# Patient Record
Sex: MALE | Race: WHITE | Employment: UNEMPLOYED | URBAN - METROPOLITAN AREA
[De-identification: names, ages, dates, MRNs, and addresses within clinical notes are randomized per-mention and may not be internally consistent; named-entity substitution may affect disease eponyms.]

---

## 2017-01-16 ENCOUNTER — OFFICE VISIT (OUTPATIENT)
Dept: INTERNAL MEDICINE CLINIC | Facility: CLINIC | Age: 47
End: 2017-01-16

## 2017-01-16 VITALS
HEIGHT: 73 IN | HEART RATE: 80 BPM | BODY MASS INDEX: 31.41 KG/M2 | TEMPERATURE: 98 F | WEIGHT: 237 LBS | SYSTOLIC BLOOD PRESSURE: 126 MMHG | DIASTOLIC BLOOD PRESSURE: 81 MMHG

## 2017-01-16 DIAGNOSIS — L21.9 SEBORRHEIC DERMATITIS: ICD-10-CM

## 2017-01-16 DIAGNOSIS — R22.1 NECK MASS: ICD-10-CM

## 2017-01-16 DIAGNOSIS — R22.1 MASS OF RIGHT SIDE OF NECK: Primary | ICD-10-CM

## 2017-01-16 DIAGNOSIS — Z72.0 TOBACCO ABUSE: ICD-10-CM

## 2017-01-16 PROCEDURE — 99214 OFFICE O/P EST MOD 30 MIN: CPT | Performed by: INTERNAL MEDICINE

## 2017-01-16 PROCEDURE — 99212 OFFICE O/P EST SF 10 MIN: CPT | Performed by: INTERNAL MEDICINE

## 2017-01-16 RX ORDER — HYDROCODONE BITARTRATE AND ACETAMINOPHEN 10; 325 MG/1; MG/1
1 TABLET ORAL EVERY 8 HOURS PRN
Qty: 30 TABLET | Refills: 0 | Status: SHIPPED | OUTPATIENT
Start: 2017-01-16 | End: 2017-03-27

## 2017-01-16 RX ORDER — MOMETASONE FUROATE 1 MG/G
CREAM TOPICAL
Qty: 50 G | Refills: 0 | Status: SHIPPED | OUTPATIENT
Start: 2017-01-16 | End: 2017-02-13 | Stop reason: ALTCHOICE

## 2017-01-17 NOTE — PROGRESS NOTES
HPI:    Patient ID: Nando Lock is a 55year old male.     HPI    Right anterior neck lipom large  Would like referal to follow up with  Surgery    New to me   Complaint of low back pain take hydrocodone rarely would like a refill for 30 pills      Pt  His swelling and gait problem. Skin: Negative for rash. Neurological: Negative for syncope, weakness, light-headedness and headaches. Hematological: Negative for adenopathy. Psychiatric/Behavioral: Negative for behavioral problems and agitation. distension. There is no tenderness. Musculoskeletal: He exhibits no edema. Lymphadenopathy:     He has no cervical adenopathy. Neurological: He is alert. Skin: He is not diaphoretic. Psychiatric: He has a normal mood and affect.  His behavior is n

## 2017-02-13 ENCOUNTER — OFFICE VISIT (OUTPATIENT)
Dept: SURGERY | Facility: CLINIC | Age: 47
End: 2017-02-13

## 2017-02-13 DIAGNOSIS — R22.1 MASS OF RIGHT SIDE OF NECK: Primary | ICD-10-CM

## 2017-02-13 PROCEDURE — 99214 OFFICE O/P EST MOD 30 MIN: CPT | Performed by: SURGERY

## 2017-02-13 PROCEDURE — 99212 OFFICE O/P EST SF 10 MIN: CPT | Performed by: SURGERY

## 2017-02-13 NOTE — H&P
History and Physical      Pipo Elizondo is a 55year old male. HPI   Patient presents with:  Mass: Pt states here to discuss removal of mass on right neck. Pt states mass present for 20 yrs - has increased in size initially.   Pt denies drainage or emeterio ROS   A comprehensive 10 point review of systems was completed. Pertinent positives and negatives noted in the the HPI. PHYSICAL EXAM   There is no weight on file to calculate BMI. No LMP for male patient.   Constitutional: appears well hyd

## 2017-02-16 ENCOUNTER — TELEPHONE (OUTPATIENT)
Dept: SURGERY | Facility: CLINIC | Age: 47
End: 2017-02-16

## 2017-02-16 NOTE — TELEPHONE ENCOUNTER
Request for prior authorization for CT soft tissue neck scheduled 02/20/2017 started via [employer] Request pending for further evaluation. Tracking #[id number]. Awaiting further instruction/notification from [employer].

## 2017-02-16 NOTE — TELEPHONE ENCOUNTER
[place]eville[employer] insurance verification xt: [id number] requesting prior auth for CT/neck. Pt is scheduled for CT on Mon 02/20.  Please call, thank you

## 2017-02-20 ENCOUNTER — HOSPITAL ENCOUNTER (OUTPATIENT)
Dept: CT IMAGING | Facility: HOSPITAL | Age: 47
Discharge: HOME | End: 2017-02-20
Attending: SURGERY
Payer: MEDICAID

## 2017-02-20 DIAGNOSIS — R22.1 MASS OF RIGHT SIDE OF NECK: ICD-10-CM

## 2017-02-20 LAB — CREAT BLD-MCNC: 0.9 MG/DL (ref 0.5–1.5)

## 2017-02-20 PROCEDURE — 82565 ASSAY OF CREATININE: CPT

## 2017-02-20 PROCEDURE — 70491 CT SOFT TISSUE NECK W/DYE: CPT

## 2017-02-20 NOTE — TELEPHONE ENCOUNTER
Per [facility], exam has been approved [id number]. LM on VM of Neville from insurance verification notifying him of this. CB number given if questions.

## 2017-02-23 ENCOUNTER — TELEPHONE (OUTPATIENT)
Dept: SURGERY | Facility: CLINIC | Age: 47
End: 2017-02-23

## 2017-02-23 NOTE — TELEPHONE ENCOUNTER
Patient notified of results and recommendations, referred to Dr. Teena Schultz or Dr. Fernanda Reynolds. Patient verbalized understanding, phone numbers provided.

## 2017-03-27 ENCOUNTER — OFFICE VISIT (OUTPATIENT)
Dept: INTERNAL MEDICINE CLINIC | Facility: CLINIC | Age: 47
End: 2017-03-27

## 2017-03-27 VITALS
HEIGHT: 73 IN | DIASTOLIC BLOOD PRESSURE: 74 MMHG | BODY MASS INDEX: 31.14 KG/M2 | SYSTOLIC BLOOD PRESSURE: 120 MMHG | HEART RATE: 64 BPM | WEIGHT: 235 LBS | RESPIRATION RATE: 18 BRPM | TEMPERATURE: 98 F

## 2017-03-27 DIAGNOSIS — Z72.0 TOBACCO ABUSE: ICD-10-CM

## 2017-03-27 DIAGNOSIS — R22.1 MASS IN NECK: ICD-10-CM

## 2017-03-27 DIAGNOSIS — E66.9 OBESITY (BMI 30.0-34.9): ICD-10-CM

## 2017-03-27 DIAGNOSIS — J44.9 CHRONIC OBSTRUCTIVE PULMONARY DISEASE, UNSPECIFIED COPD TYPE (HCC): ICD-10-CM

## 2017-03-27 DIAGNOSIS — Z00.00 ROUTINE GENERAL MEDICAL EXAMINATION AT A HEALTH CARE FACILITY: Primary | ICD-10-CM

## 2017-03-27 PROCEDURE — 99396 PREV VISIT EST AGE 40-64: CPT | Performed by: INTERNAL MEDICINE

## 2017-03-27 RX ORDER — HYDROCODONE BITARTRATE AND ACETAMINOPHEN 10; 325 MG/1; MG/1
1 TABLET ORAL EVERY 8 HOURS PRN
Qty: 30 TABLET | Refills: 0 | Status: SHIPPED | OUTPATIENT
Start: 2017-03-27 | End: 2017-05-22

## 2017-03-28 ENCOUNTER — APPOINTMENT (OUTPATIENT)
Dept: LAB | Age: 47
End: 2017-03-28
Attending: INTERNAL MEDICINE
Payer: MEDICAID

## 2017-03-28 ENCOUNTER — HOSPITAL ENCOUNTER (OUTPATIENT)
Dept: GENERAL RADIOLOGY | Age: 47
Discharge: HOME | End: 2017-03-28
Attending: INTERNAL MEDICINE
Payer: MEDICAID

## 2017-03-28 DIAGNOSIS — Z00.00 ROUTINE GENERAL MEDICAL EXAMINATION AT A HEALTH CARE FACILITY: ICD-10-CM

## 2017-03-28 DIAGNOSIS — R22.1 MASS IN NECK: ICD-10-CM

## 2017-03-28 PROCEDURE — 84439 ASSAY OF FREE THYROXINE: CPT

## 2017-03-28 PROCEDURE — 93010 ELECTROCARDIOGRAM REPORT: CPT

## 2017-03-28 PROCEDURE — 84443 ASSAY THYROID STIM HORMONE: CPT

## 2017-03-28 PROCEDURE — 85027 COMPLETE CBC AUTOMATED: CPT

## 2017-03-28 PROCEDURE — 81003 URINALYSIS AUTO W/O SCOPE: CPT

## 2017-03-28 PROCEDURE — 80061 LIPID PANEL: CPT

## 2017-03-28 PROCEDURE — 71020 XR CHEST PA + LAT CHEST (CPT=71020): CPT

## 2017-03-28 PROCEDURE — 93005 ELECTROCARDIOGRAM TRACING: CPT

## 2017-03-28 PROCEDURE — 36415 COLL VENOUS BLD VENIPUNCTURE: CPT

## 2017-03-28 PROCEDURE — 80053 COMPREHEN METABOLIC PANEL: CPT

## 2017-03-28 NOTE — PROGRESS NOTES
HPI:    Patient ID: Xander Stern is a 55year old male.     HPI    Physical/ Pre Op  Excision or neck mass      Right sided neck mass   Was seen by Dr Melita Powell    Referred to ENT for management  CT findings   Pt is here to discuss  Complain of neck pain would Hematological: Negative for adenopathy. Does not bruise/bleed easily. Psychiatric/Behavioral: Negative for behavioral problems and agitation.            Current Outpatient Prescriptions:  HYDROcodone-acetaminophen  MG Oral Tab Take 1 tablet by vance tenderness. Lymphadenopathy:     He has no cervical adenopathy. Neurological: He is alert. Skin: No rash noted. He is not diaphoretic. No erythema. Psychiatric: He has a normal mood and affect.  His behavior is normal.            ASSESSMENT/PLAN:

## 2017-04-11 ENCOUNTER — OFFICE VISIT (OUTPATIENT)
Dept: OTOLARYNGOLOGY | Facility: CLINIC | Age: 47
End: 2017-04-11

## 2017-04-11 VITALS
TEMPERATURE: 97 F | HEIGHT: 73 IN | DIASTOLIC BLOOD PRESSURE: 60 MMHG | BODY MASS INDEX: 30.48 KG/M2 | SYSTOLIC BLOOD PRESSURE: 98 MMHG | WEIGHT: 230 LBS

## 2017-04-11 DIAGNOSIS — R22.1 NECK MASS: Primary | ICD-10-CM

## 2017-04-11 PROCEDURE — 99212 OFFICE O/P EST SF 10 MIN: CPT | Performed by: OTOLARYNGOLOGY

## 2017-04-11 PROCEDURE — 99244 OFF/OP CNSLTJ NEW/EST MOD 40: CPT | Performed by: OTOLARYNGOLOGY

## 2017-04-11 NOTE — PROGRESS NOTES
Ivan Tellez is a 55year old male. Patient presents with:  Consult: mass in right neck x several years      HISTORY OF PRESENT ILLNESS  He presents with a several year history of enlarging right neck mass by his angle of the mandible.  He states that this Negative Abdominal pain and diarrhea. Endocrine Negative Cold intolerance and heat intolerance. Neuro Negative Tremors. Psych Negative Anxiety and depression. Integumentary Negative Frequent skin infections, pigment change and rash.    Hema/Lymph Ne 0  ASSESSMENT AND PLAN    1. Neck mass  I personally reviewed his CT scan this demonstrates this mass to be anterior to the platysma.  I did recommend excision of this mass and there should be minimal risk to any nervous structures due to the fact that it i

## 2017-04-13 ENCOUNTER — TELEPHONE (OUTPATIENT)
Dept: OTOLARYNGOLOGY | Facility: CLINIC | Age: 47
End: 2017-04-13

## 2017-04-13 DIAGNOSIS — Z01.818 PREOP TESTING: Primary | ICD-10-CM

## 2017-04-19 ENCOUNTER — LAB ENCOUNTER (OUTPATIENT)
Dept: LAB | Facility: HOSPITAL | Age: 47
End: 2017-04-19
Attending: OTOLARYNGOLOGY
Payer: MEDICAID

## 2017-04-19 PROCEDURE — 93005 ELECTROCARDIOGRAM TRACING: CPT

## 2017-04-19 PROCEDURE — 93010 ELECTROCARDIOGRAM REPORT: CPT | Performed by: OTOLARYNGOLOGY

## 2017-05-01 ENCOUNTER — SURGERY (OUTPATIENT)
Age: 47
End: 2017-05-01

## 2017-05-01 ENCOUNTER — HOSPITAL ENCOUNTER (OUTPATIENT)
Facility: HOSPITAL | Age: 47
Setting detail: HOSPITAL OUTPATIENT SURGERY
Discharge: HOME | End: 2017-05-01
Attending: OTOLARYNGOLOGY | Admitting: OTOLARYNGOLOGY
Payer: MEDICAID

## 2017-05-01 ENCOUNTER — ANESTHESIA EVENT (OUTPATIENT)
Dept: SURGERY | Facility: HOSPITAL | Age: 47
End: 2017-05-01
Payer: MEDICAID

## 2017-05-01 ENCOUNTER — ANESTHESIA (OUTPATIENT)
Dept: SURGERY | Facility: HOSPITAL | Age: 47
End: 2017-05-01
Payer: MEDICAID

## 2017-05-01 VITALS
DIASTOLIC BLOOD PRESSURE: 70 MMHG | HEART RATE: 51 BPM | SYSTOLIC BLOOD PRESSURE: 127 MMHG | TEMPERATURE: 97 F | HEIGHT: 73 IN | OXYGEN SATURATION: 97 % | WEIGHT: 233.69 LBS | RESPIRATION RATE: 16 BRPM | BODY MASS INDEX: 30.97 KG/M2

## 2017-05-01 DIAGNOSIS — R22.1 NECK MASS: Primary | ICD-10-CM

## 2017-05-01 PROCEDURE — 13132 CMPLX RPR F/C/C/M/N/AX/G/H/F: CPT | Performed by: OTOLARYNGOLOGY

## 2017-05-01 PROCEDURE — 11426 EXC H-F-NK-SP B9+MARG >4 CM: CPT | Performed by: OTOLARYNGOLOGY

## 2017-05-01 PROCEDURE — 0HB4XZZ EXCISION OF NECK SKIN, EXTERNAL APPROACH: ICD-10-PCS | Performed by: OTOLARYNGOLOGY

## 2017-05-01 RX ORDER — MORPHINE SULFATE 4 MG/ML
8 INJECTION, SOLUTION INTRAMUSCULAR; INTRAVENOUS EVERY 2 HOUR PRN
Status: DISCONTINUED | OUTPATIENT
Start: 2017-05-01 | End: 2017-05-01

## 2017-05-01 RX ORDER — CEPHALEXIN 500 MG/1
500 CAPSULE ORAL EVERY 8 HOURS
Qty: 21 CAPSULE | Refills: 0 | Status: SHIPPED | OUTPATIENT
Start: 2017-05-01 | End: 2017-05-22 | Stop reason: ALTCHOICE

## 2017-05-01 RX ORDER — MORPHINE SULFATE 2 MG/ML
2 INJECTION, SOLUTION INTRAMUSCULAR; INTRAVENOUS EVERY 10 MIN PRN
Status: DISCONTINUED | OUTPATIENT
Start: 2017-05-01 | End: 2017-05-01

## 2017-05-01 RX ORDER — HYDROCODONE BITARTRATE AND ACETAMINOPHEN 5; 325 MG/1; MG/1
1 TABLET ORAL AS NEEDED
Status: DISCONTINUED | OUTPATIENT
Start: 2017-05-01 | End: 2017-05-01

## 2017-05-01 RX ORDER — ONDANSETRON 2 MG/ML
4 INJECTION INTRAMUSCULAR; INTRAVENOUS ONCE AS NEEDED
Status: DISCONTINUED | OUTPATIENT
Start: 2017-05-01 | End: 2017-05-01

## 2017-05-01 RX ORDER — HYDROMORPHONE HYDROCHLORIDE 1 MG/ML
0.4 INJECTION, SOLUTION INTRAMUSCULAR; INTRAVENOUS; SUBCUTANEOUS EVERY 5 MIN PRN
Status: DISCONTINUED | OUTPATIENT
Start: 2017-05-01 | End: 2017-05-01

## 2017-05-01 RX ORDER — DEXAMETHASONE SODIUM PHOSPHATE 4 MG/ML
VIAL (ML) INJECTION AS NEEDED
Status: DISCONTINUED | OUTPATIENT
Start: 2017-05-01 | End: 2017-05-01 | Stop reason: SURG

## 2017-05-01 RX ORDER — MIDAZOLAM HYDROCHLORIDE 1 MG/ML
INJECTION INTRAMUSCULAR; INTRAVENOUS AS NEEDED
Status: DISCONTINUED | OUTPATIENT
Start: 2017-05-01 | End: 2017-05-01 | Stop reason: SURG

## 2017-05-01 RX ORDER — LIDOCAINE HYDROCHLORIDE AND EPINEPHRINE 10; 10 MG/ML; UG/ML
INJECTION, SOLUTION INFILTRATION; PERINEURAL AS NEEDED
Status: DISCONTINUED | OUTPATIENT
Start: 2017-05-01 | End: 2017-05-01 | Stop reason: HOSPADM

## 2017-05-01 RX ORDER — HALOPERIDOL 5 MG/ML
0.25 INJECTION INTRAMUSCULAR ONCE AS NEEDED
Status: DISCONTINUED | OUTPATIENT
Start: 2017-05-01 | End: 2017-05-01

## 2017-05-01 RX ORDER — MORPHINE SULFATE 4 MG/ML
4 INJECTION, SOLUTION INTRAMUSCULAR; INTRAVENOUS EVERY 2 HOUR PRN
Status: DISCONTINUED | OUTPATIENT
Start: 2017-05-01 | End: 2017-05-01

## 2017-05-01 RX ORDER — SODIUM CHLORIDE, SODIUM LACTATE, POTASSIUM CHLORIDE, CALCIUM CHLORIDE 600; 310; 30; 20 MG/100ML; MG/100ML; MG/100ML; MG/100ML
INJECTION, SOLUTION INTRAVENOUS CONTINUOUS
Status: DISCONTINUED | OUTPATIENT
Start: 2017-05-01 | End: 2017-05-01

## 2017-05-01 RX ORDER — ACETAMINOPHEN 325 MG/1
650 TABLET ORAL ONCE
Status: COMPLETED | OUTPATIENT
Start: 2017-05-01 | End: 2017-05-01

## 2017-05-01 RX ORDER — HYDROCODONE BITARTRATE AND ACETAMINOPHEN 5; 325 MG/1; MG/1
2 TABLET ORAL AS NEEDED
Status: DISCONTINUED | OUTPATIENT
Start: 2017-05-01 | End: 2017-05-01

## 2017-05-01 RX ORDER — MORPHINE SULFATE 10 MG/ML
6 INJECTION, SOLUTION INTRAMUSCULAR; INTRAVENOUS EVERY 10 MIN PRN
Status: DISCONTINUED | OUTPATIENT
Start: 2017-05-01 | End: 2017-05-01

## 2017-05-01 RX ORDER — HYDROMORPHONE HYDROCHLORIDE 1 MG/ML
0.2 INJECTION, SOLUTION INTRAMUSCULAR; INTRAVENOUS; SUBCUTANEOUS EVERY 5 MIN PRN
Status: DISCONTINUED | OUTPATIENT
Start: 2017-05-01 | End: 2017-05-01

## 2017-05-01 RX ORDER — LIDOCAINE HYDROCHLORIDE 10 MG/ML
INJECTION, SOLUTION EPIDURAL; INFILTRATION; INTRACAUDAL; PERINEURAL AS NEEDED
Status: DISCONTINUED | OUTPATIENT
Start: 2017-05-01 | End: 2017-05-01 | Stop reason: SURG

## 2017-05-01 RX ORDER — LIDOCAINE HYDROCHLORIDE 40 MG/ML
SOLUTION TOPICAL AS NEEDED
Status: DISCONTINUED | OUTPATIENT
Start: 2017-05-01 | End: 2017-05-01 | Stop reason: SURG

## 2017-05-01 RX ORDER — SODIUM CHLORIDE, SODIUM LACTATE, POTASSIUM CHLORIDE, CALCIUM CHLORIDE 600; 310; 30; 20 MG/100ML; MG/100ML; MG/100ML; MG/100ML
INJECTION, SOLUTION INTRAVENOUS CONTINUOUS PRN
Status: DISCONTINUED | OUTPATIENT
Start: 2017-05-01 | End: 2017-05-01 | Stop reason: SURG

## 2017-05-01 RX ORDER — FAMOTIDINE 20 MG/1
20 TABLET ORAL ONCE
Status: COMPLETED | OUTPATIENT
Start: 2017-05-01 | End: 2017-05-01

## 2017-05-01 RX ORDER — MORPHINE SULFATE 2 MG/ML
2 INJECTION, SOLUTION INTRAMUSCULAR; INTRAVENOUS EVERY 2 HOUR PRN
Status: DISCONTINUED | OUTPATIENT
Start: 2017-05-01 | End: 2017-05-01

## 2017-05-01 RX ORDER — HYDROMORPHONE HYDROCHLORIDE 1 MG/ML
0.6 INJECTION, SOLUTION INTRAMUSCULAR; INTRAVENOUS; SUBCUTANEOUS EVERY 5 MIN PRN
Status: DISCONTINUED | OUTPATIENT
Start: 2017-05-01 | End: 2017-05-01

## 2017-05-01 RX ORDER — MORPHINE SULFATE 4 MG/ML
4 INJECTION, SOLUTION INTRAMUSCULAR; INTRAVENOUS EVERY 10 MIN PRN
Status: DISCONTINUED | OUTPATIENT
Start: 2017-05-01 | End: 2017-05-01

## 2017-05-01 RX ORDER — HYDROCODONE BITARTRATE AND ACETAMINOPHEN 5; 325 MG/1; MG/1
1 TABLET ORAL EVERY 4 HOURS PRN
Status: DISCONTINUED | OUTPATIENT
Start: 2017-05-01 | End: 2017-05-01

## 2017-05-01 RX ORDER — ONDANSETRON 2 MG/ML
INJECTION INTRAMUSCULAR; INTRAVENOUS AS NEEDED
Status: DISCONTINUED | OUTPATIENT
Start: 2017-05-01 | End: 2017-05-01 | Stop reason: SURG

## 2017-05-01 RX ORDER — ROCURONIUM BROMIDE 10 MG/ML
INJECTION, SOLUTION INTRAVENOUS AS NEEDED
Status: DISCONTINUED | OUTPATIENT
Start: 2017-05-01 | End: 2017-05-01 | Stop reason: SURG

## 2017-05-01 RX ORDER — SUCCINYLCHOLINE CHLORIDE 20 MG/ML
INJECTION INTRAMUSCULAR; INTRAVENOUS AS NEEDED
Status: DISCONTINUED | OUTPATIENT
Start: 2017-05-01 | End: 2017-05-01 | Stop reason: SURG

## 2017-05-01 RX ORDER — ACETAMINOPHEN 325 MG/1
650 TABLET ORAL EVERY 4 HOURS PRN
Status: DISCONTINUED | OUTPATIENT
Start: 2017-05-01 | End: 2017-05-01

## 2017-05-01 RX ORDER — SODIUM CHLORIDE 0.9 % (FLUSH) 0.9 %
10 SYRINGE (ML) INJECTION AS NEEDED
Status: DISCONTINUED | OUTPATIENT
Start: 2017-05-01 | End: 2017-05-01

## 2017-05-01 RX ORDER — METOCLOPRAMIDE 10 MG/1
10 TABLET ORAL ONCE
Status: COMPLETED | OUTPATIENT
Start: 2017-05-01 | End: 2017-05-01

## 2017-05-01 RX ORDER — ONDANSETRON 2 MG/ML
4 INJECTION INTRAMUSCULAR; INTRAVENOUS EVERY 6 HOURS PRN
Status: DISCONTINUED | OUTPATIENT
Start: 2017-05-01 | End: 2017-05-01

## 2017-05-01 RX ORDER — NALOXONE HYDROCHLORIDE 0.4 MG/ML
80 INJECTION, SOLUTION INTRAMUSCULAR; INTRAVENOUS; SUBCUTANEOUS AS NEEDED
Status: DISCONTINUED | OUTPATIENT
Start: 2017-05-01 | End: 2017-05-01

## 2017-05-01 RX ORDER — HYDROCODONE BITARTRATE AND ACETAMINOPHEN 5; 325 MG/1; MG/1
2 TABLET ORAL EVERY 4 HOURS PRN
Status: DISCONTINUED | OUTPATIENT
Start: 2017-05-01 | End: 2017-05-01

## 2017-05-01 RX ADMIN — MIDAZOLAM HYDROCHLORIDE 2 MG: 1 INJECTION INTRAMUSCULAR; INTRAVENOUS at 07:19:00

## 2017-05-01 RX ADMIN — LIDOCAINE HYDROCHLORIDE 4 ML: 40 SOLUTION TOPICAL at 07:22:00

## 2017-05-01 RX ADMIN — ONDANSETRON 4 MG: 2 INJECTION INTRAMUSCULAR; INTRAVENOUS at 07:31:00

## 2017-05-01 RX ADMIN — SUCCINYLCHOLINE CHLORIDE 140 MG: 20 INJECTION INTRAMUSCULAR; INTRAVENOUS at 07:22:00

## 2017-05-01 RX ADMIN — ROCURONIUM BROMIDE 10 MG: 10 INJECTION, SOLUTION INTRAVENOUS at 07:22:00

## 2017-05-01 RX ADMIN — SODIUM CHLORIDE, SODIUM LACTATE, POTASSIUM CHLORIDE, CALCIUM CHLORIDE: 600; 310; 30; 20 INJECTION, SOLUTION INTRAVENOUS at 08:00:00

## 2017-05-01 RX ADMIN — DEXAMETHASONE SODIUM PHOSPHATE 4 MG: 4 MG/ML VIAL (ML) INJECTION at 07:31:00

## 2017-05-01 RX ADMIN — SODIUM CHLORIDE, SODIUM LACTATE, POTASSIUM CHLORIDE, CALCIUM CHLORIDE: 600; 310; 30; 20 INJECTION, SOLUTION INTRAVENOUS at 07:19:00

## 2017-05-01 RX ADMIN — LIDOCAINE HYDROCHLORIDE 50 MG: 10 INJECTION, SOLUTION EPIDURAL; INFILTRATION; INTRACAUDAL; PERINEURAL at 07:22:00

## 2017-05-01 NOTE — INTERVAL H&P NOTE
Pre-op Diagnosis: Neck mass [R22.1]    The above referenced H&P was reviewed by Isma Dover. Teena Schultz MD on 5/1/2017, the patient was examined and no significant changes have occurred in the patient's condition since the H&P was performed.   I discussed with the

## 2017-05-01 NOTE — BRIEF OP NOTE
Pre-Operative Diagnosis: Neck mass [R22.1]     Post-Operative Diagnosis: Neck mass [R22.1]     Procedure Performed:   Procedure(s):  Excision of right neck mass       Surgeon(s) and Role:     * Jessica Perez MD - Primary     * Steve Lorenzo MD - Ass

## 2017-05-01 NOTE — ANESTHESIA PREPROCEDURE EVALUATION
Anesthesia PreOp Note    HPI:     Anthony Owusu is a 55year old male who presents for preoperative consultation requested by: Soumya Oleary MD    Date of Surgery: 5/1/2017    Procedure(s):  LESION WIDE EXCISION WITH SKIN GRAFT  Indication: Neck mass [R22 20. 00 Years     Smokeless tobacco: Not on file    Alcohol Use: Yes  0.0 oz/week    0 Standard drinks or equivalent per week         Comment: occasionally    Drug Use: Yes     Sexual Activity: Not on file   Not on file  Other Topics Concern   None on file desires the anesthetic management as planned.   David Milner  5/1/2017 7:00 AM

## 2017-05-01 NOTE — ANESTHESIA POSTPROCEDURE EVALUATION
Patient: Anthony Owusu    Procedure Summary     Date Anesthesia Start Anesthesia Stop Room / Location    05/01/17 0719 0805 300 [facility] MAIN OR 03 / 300 [facility] MAIN OR       Procedure Diagnosis Surgeon Responsible Provider    LESION WIDE EXCISION WITH SKIN GRAFT (Right )

## 2017-05-01 NOTE — H&P
HISTORY OF PRESENT ILLNESS  He presents with a several year history of enlarging right neck mass by his angle of the mandible. He states that this has been increasing in size dramatically over the past year.  He was seen by a general surgeon referred to us diarrhea.    Endocrine  Negative  Cold intolerance and heat intolerance.    Neuro  Negative  Tremors.    Psych  Negative  Anxiety and depression.    Integumentary  Negative  Frequent skin infections, pigment change and rash.    Hema/Lymph  Negative  Easy b needed for Pain., Disp: 30 tablet, Rfl: 0  ASSESSMENT AND PLAN    1. Neck mass  I personally reviewed his CT scan this demonstrates this mass to be anterior to the platysma.  I did recommend excision of this mass and there should be minimal risk to any nerv

## 2017-05-01 NOTE — OPERATIVE REPORT
[facility]    PATIENT'S NAME: Gerri Riley   ATTENDING PHYSICIAN: David Weiss MD   OPERATING PHYSICIAN: Olivia Ovalles.  Shira Weiss MD   PATIENT ACCOUNT#:   [id number]    LOCATION:  [facility]  MEDICAL RECORD #:   [id number]       DATE OF BIR closed using subcuticular 3-0 Vicryl suture. Skin edges were approximated, everted, and closed using a running subcuticular 4-0 Prolene suture.   The rent in the skin that was treated with removal of the cyst was closed using a vertical mattress stitch of

## 2017-05-01 NOTE — ADDENDUM NOTE
Addendum  created 05/01/17 4474 by Maricarmen Valderrama MD    Modules edited: Orders, PRL Based Order Sets

## 2017-05-02 ENCOUNTER — TELEPHONE (OUTPATIENT)
Dept: OTOLARYNGOLOGY | Facility: CLINIC | Age: 47
End: 2017-05-02

## 2017-05-02 NOTE — TELEPHONE ENCOUNTER
Pt is post op day 1, excision of R neck mass. Per pt he is doing well, pain is tolerable.   Advised pt to keep site clean and dry, keep site elevated to prevent swelling, continue abx as prescribed, monitor for temp > 102 deg F, drainage or bleeding from i

## 2017-05-04 ENCOUNTER — TELEPHONE (OUTPATIENT)
Dept: OTOLARYNGOLOGY | Facility: CLINIC | Age: 47
End: 2017-05-04

## 2017-05-09 ENCOUNTER — OFFICE VISIT (OUTPATIENT)
Dept: OTOLARYNGOLOGY | Facility: CLINIC | Age: 47
End: 2017-05-09

## 2017-05-09 VITALS
SYSTOLIC BLOOD PRESSURE: 120 MMHG | BODY MASS INDEX: 30.48 KG/M2 | DIASTOLIC BLOOD PRESSURE: 83 MMHG | TEMPERATURE: 98 F | HEIGHT: 73 IN | WEIGHT: 230 LBS

## 2017-05-09 DIAGNOSIS — R22.1 NECK MASS: Primary | ICD-10-CM

## 2017-05-09 PROCEDURE — 99212 OFFICE O/P EST SF 10 MIN: CPT | Performed by: OTOLARYNGOLOGY

## 2017-05-09 PROCEDURE — 99024 POSTOP FOLLOW-UP VISIT: CPT | Performed by: OTOLARYNGOLOGY

## 2017-05-09 NOTE — PROGRESS NOTES
Mary Serrano is a 55year old male. Patient presents with:  Post-Op: s/p excision of right neck mass done 5/1/17      HISTORY OF PRESENT ILLNESS  He presents with a several year history of enlarging right neck mass by his angle of the mandible.  He states Negative Drooling. Eyes Negative Blurred vision and vision changes. Respiratory Negative Dyspnea and wheezing. Cardio Negative Chest pain, irregular heartbeat/palpitations and syncope. GI Negative Abdominal pain and diarrhea.    Endocrine Negative C Oral Cap, Take 1 capsule (500 mg total) by mouth every 8 (eight) hours. , Disp: 21 capsule, Rfl: 0  •  HYDROcodone-acetaminophen  MG Oral Tab, Take 1 tablet by mouth every 8 (eight) hours as needed for Pain., Disp: 30 tablet, Rfl: 0  ASSESSMENT AND PL

## 2017-05-10 ENCOUNTER — TELEPHONE (OUTPATIENT)
Dept: INTERNAL MEDICINE CLINIC | Facility: CLINIC | Age: 47
End: 2017-05-10

## 2017-05-10 RX ORDER — HYDROCODONE BITARTRATE AND ACETAMINOPHEN 10; 325 MG/1; MG/1
1 TABLET ORAL EVERY 8 HOURS PRN
Qty: 30 TABLET | Refills: 0 | Status: CANCELLED | OUTPATIENT
Start: 2017-05-10

## 2017-05-10 NOTE — TELEPHONE ENCOUNTER
Dr Mayte Omer, patient will be out of Altagracia Villalba in 3 days, post op from 5/1/17, no appt available for f/u visit within the next 3 days, please advise on appt or refill, prefers  rx at [facility]    Pending Prescriptions Disp Refills    HYDROcodone-acetaminophen 1

## 2017-05-10 NOTE — TELEPHONE ENCOUNTER
Pt asking if [facility] would refill [place]?  Had neck surgery- will be out within 3 days    Requested appt with Dr Jesus Robledo only- no slots  Said [facility] wanted to see him after his surgery  Or if does not have to be seen if could do rx  Prefers p/u ADO

## 2017-05-11 NOTE — TELEPHONE ENCOUNTER
i am not in the office today  What kind of symptom is he having  Phone in tramadol 50 mg po bid prn # 40

## 2017-05-11 NOTE — TELEPHONE ENCOUNTER
Dr. Awilda Escobar: Per patient, he has moderate pain on neck from surgery 2 weeks ago. He has been using his Hydrocodone he had for pain relief. He doesn't take it continuously, only as needed. He has about 4 pills left. He usually cuts them in half.      He dec

## 2017-05-18 NOTE — TELEPHONE ENCOUNTER
Pt advised of appt as stated below. Pt states unable to go to [address] [facility],  [address] location. Needs appt at [facility] as stated below. Therefore appt made for Monday 4/22/17 at .   Future Appointments  Date Time Provider Winifred Valadez   5/22/2017 1:00 PM Romain Vazquez

## 2017-05-22 ENCOUNTER — OFFICE VISIT (OUTPATIENT)
Dept: INTERNAL MEDICINE CLINIC | Facility: CLINIC | Age: 47
End: 2017-05-22

## 2017-05-22 VITALS
SYSTOLIC BLOOD PRESSURE: 108 MMHG | BODY MASS INDEX: 31 KG/M2 | HEART RATE: 82 BPM | WEIGHT: 232 LBS | DIASTOLIC BLOOD PRESSURE: 69 MMHG

## 2017-05-22 DIAGNOSIS — Z72.0 TOBACCO ABUSE: ICD-10-CM

## 2017-05-22 DIAGNOSIS — Z87.2 HISTORY OF EXCISION OF EPIDERMAL INCLUSION CYST: ICD-10-CM

## 2017-05-22 DIAGNOSIS — Z98.890 HISTORY OF EXCISION OF EPIDERMAL INCLUSION CYST: ICD-10-CM

## 2017-05-22 DIAGNOSIS — M54.50 CHRONIC LOW BACK PAIN WITHOUT SCIATICA, UNSPECIFIED BACK PAIN LATERALITY: Primary | ICD-10-CM

## 2017-05-22 DIAGNOSIS — J44.9 CHRONIC OBSTRUCTIVE PULMONARY DISEASE, UNSPECIFIED COPD TYPE (HCC): ICD-10-CM

## 2017-05-22 DIAGNOSIS — G89.29 CHRONIC LOW BACK PAIN WITHOUT SCIATICA, UNSPECIFIED BACK PAIN LATERALITY: Primary | ICD-10-CM

## 2017-05-22 PROCEDURE — 99214 OFFICE O/P EST MOD 30 MIN: CPT | Performed by: INTERNAL MEDICINE

## 2017-05-22 PROCEDURE — 99212 OFFICE O/P EST SF 10 MIN: CPT | Performed by: INTERNAL MEDICINE

## 2017-05-22 RX ORDER — HYDROCODONE BITARTRATE AND ACETAMINOPHEN 10; 325 MG/1; MG/1
1 TABLET ORAL EVERY 8 HOURS PRN
Qty: 30 TABLET | Refills: 0 | Status: SHIPPED | OUTPATIENT
Start: 2017-05-22 | End: 2017-09-18

## 2017-05-23 ENCOUNTER — TELEPHONE (OUTPATIENT)
Dept: INTERNAL MEDICINE CLINIC | Facility: CLINIC | Age: 47
End: 2017-05-23

## 2017-05-23 NOTE — TELEPHONE ENCOUNTER
Pt calling states he discuss results with Dr in office, pt states he would like to speak to nurse to obtain the actual cholesterol #.

## 2017-05-24 NOTE — TELEPHONE ENCOUNTER
Verified name and . Pt requesting exact number results from Lipid panel. Results given and pt understanding. Enc low cholesterol diet/exercise per Dr Carmen Long recommendations and pt verb understanding.

## 2017-06-05 NOTE — PROGRESS NOTES
HPI:    Patient ID: Pipo Elizondo is a 52year old male.     HPI      Here for follow up    S/p op  Wound healing well no redness no fever       /01/17 0719 0805 [facility] MAIN OR 03 / [facility] MAIN OR           Procedure Diagnosis Surgeon Responsible Provider       LE Pain. Disp: 30 tablet Rfl: 0     Allergies:No Known Allergies    HISTORY:  Past Medical History   Diagnosis Date   • Bell's palsy    • COPD (chronic obstructive pulmonary disease) ([facility] [address]    • Hyperlipidemia    • Mass of right side of neck    • Vitamin D def (M54.5,  G89.29) Chronic low back pain without sciatica, unspecified back pain laterality  (primary encounter diagnosis)  Plan:  SIDE EFFECT DISCUSSED  PATIENT IS NOT AT ALL TO OPERATE A MAHCINERY OR DRIVE A VEHICLE WHEN TAKING THIS MEDICATION.   PATIENT

## 2017-09-18 ENCOUNTER — OFFICE VISIT (OUTPATIENT)
Dept: INTERNAL MEDICINE CLINIC | Facility: CLINIC | Age: 47
End: 2017-09-18

## 2017-09-18 VITALS
TEMPERATURE: 98 F | DIASTOLIC BLOOD PRESSURE: 71 MMHG | WEIGHT: 225 LBS | HEART RATE: 76 BPM | HEIGHT: 73 IN | SYSTOLIC BLOOD PRESSURE: 136 MMHG | BODY MASS INDEX: 29.82 KG/M2

## 2017-09-18 DIAGNOSIS — M77.8 RIGHT ELBOW TENDINITIS: ICD-10-CM

## 2017-09-18 DIAGNOSIS — G89.29 CHRONIC LOW BACK PAIN WITHOUT SCIATICA, UNSPECIFIED BACK PAIN LATERALITY: ICD-10-CM

## 2017-09-18 DIAGNOSIS — M54.50 CHRONIC LOW BACK PAIN WITHOUT SCIATICA, UNSPECIFIED BACK PAIN LATERALITY: ICD-10-CM

## 2017-09-18 DIAGNOSIS — E78.5 HYPERLIPIDEMIA, UNSPECIFIED HYPERLIPIDEMIA TYPE: Primary | ICD-10-CM

## 2017-09-18 DIAGNOSIS — L21.9 SEBORRHEIC DERMATITIS: ICD-10-CM

## 2017-09-18 DIAGNOSIS — Z72.0 TOBACCO ABUSE: ICD-10-CM

## 2017-09-18 DIAGNOSIS — J34.2 NASAL SEPTAL DEVIATION: ICD-10-CM

## 2017-09-18 DIAGNOSIS — J44.9 CHRONIC OBSTRUCTIVE PULMONARY DISEASE, UNSPECIFIED COPD TYPE (HCC): ICD-10-CM

## 2017-09-18 DIAGNOSIS — K08.109 EDENTULOUS: ICD-10-CM

## 2017-09-18 PROCEDURE — 99214 OFFICE O/P EST MOD 30 MIN: CPT | Performed by: INTERNAL MEDICINE

## 2017-09-18 PROCEDURE — 99212 OFFICE O/P EST SF 10 MIN: CPT | Performed by: INTERNAL MEDICINE

## 2017-09-18 RX ORDER — HYDROCODONE BITARTRATE AND ACETAMINOPHEN 10; 325 MG/1; MG/1
1 TABLET ORAL EVERY 8 HOURS PRN
Qty: 30 TABLET | Refills: 0 | Status: SHIPPED | OUTPATIENT
Start: 2017-09-18 | End: 2017-12-11

## 2017-09-18 NOTE — PROGRESS NOTES
HPI:    Patient ID: Geovany Del Rosario is a 52year old male.     HPI    Follow up  Changing insurance at the end of the monght  Facial rash forehaed and periorbiatl right  Left elbow pain  /71 (BP Location: Right arm, Patient Position: Sitting, Cuff Size: [facility])    • Hyperlipidemia    • Mass in neck    • Mass of right side of neck    • Vitamin D deficiency       Past Surgical History:  2005: ANKLE FRACTURE SURGERY Right  2016: DENTAL SURGERY PROCEDURE      Comment: total extraction  No date: EXCISE LESN NECK COPD type ([facility] [address]  Plan: mild per patient still smoking     (Z72.0) Tobacco abuse  Plan: cessation aidvised strongly    (M54.5,  G89.29) Chronic low back pain without sciatica, unspecified back pain laterality  Plan: mild       (J34.2) Nasal septal deviation

## 2017-09-25 ENCOUNTER — OFFICE VISIT (OUTPATIENT)
Dept: DERMATOLOGY CLINIC | Facility: CLINIC | Age: 47
End: 2017-09-25

## 2017-09-25 VITALS — WEIGHT: 225 LBS | HEIGHT: 73 IN | BODY MASS INDEX: 29.82 KG/M2

## 2017-09-25 DIAGNOSIS — L71.9 ROSACEA: ICD-10-CM

## 2017-09-25 DIAGNOSIS — L21.9 SEBORRHEIC DERMATITIS: ICD-10-CM

## 2017-09-25 DIAGNOSIS — L30.9 DERMATITIS: Primary | ICD-10-CM

## 2017-09-25 PROCEDURE — 99212 OFFICE O/P EST SF 10 MIN: CPT | Performed by: DERMATOLOGY

## 2017-09-25 PROCEDURE — 99202 OFFICE O/P NEW SF 15 MIN: CPT | Performed by: DERMATOLOGY

## 2017-09-25 RX ORDER — KETOCONAZOLE 20 MG/ML
SHAMPOO TOPICAL
Qty: 120 ML | Refills: 2 | Status: SHIPPED | OUTPATIENT
Start: 2017-09-25 | End: 2018-01-19

## 2017-09-25 RX ORDER — DOXYCYCLINE HYCLATE 100 MG/1
100 CAPSULE ORAL 2 TIMES DAILY
Qty: 60 CAPSULE | Refills: 2 | Status: SHIPPED | OUTPATIENT
Start: 2017-09-25 | End: 2017-10-25

## 2017-10-08 NOTE — PROGRESS NOTES
Jeanie Haddad is a 52year old male. Patient presents with:  Derm Problem: scaly patches on face ,denies pain itch or bleeding x 1 year . worse in heat, patient was using steroid cream which made it worse .             Review of patient's allergies indic Right  2016: DENTAL SURGERY PROCEDURE      Comment: total extraction  No date: EXCISE LESN NECK/CHEST,SUBCUTAN    Social History  Social History   Marital status: Single  Spouse name: N/A    Years of education: N/A  Number of children: 0     Occupational H conjunctival erythema, eyelid telangiectasias. No other areas of involvement. Exam otherwise significant for prominent erythema scaling flaking over the brows face nasolabial folds hairline.   Auricularly more follicular inflammatory papules over the sc follow-up as above.

## 2017-12-11 ENCOUNTER — OFFICE VISIT (OUTPATIENT)
Dept: INTERNAL MEDICINE CLINIC | Facility: CLINIC | Age: 47
End: 2017-12-11

## 2017-12-11 VITALS
WEIGHT: 225 LBS | HEIGHT: 73 IN | BODY MASS INDEX: 29.82 KG/M2 | DIASTOLIC BLOOD PRESSURE: 69 MMHG | SYSTOLIC BLOOD PRESSURE: 113 MMHG | HEART RATE: 60 BPM | TEMPERATURE: 98 F

## 2017-12-11 DIAGNOSIS — L21.9 SEBORRHEIC DERMATITIS: Primary | ICD-10-CM

## 2017-12-11 DIAGNOSIS — Z72.0 TOBACCO ABUSE: ICD-10-CM

## 2017-12-11 DIAGNOSIS — M54.50 CHRONIC LOW BACK PAIN WITHOUT SCIATICA, UNSPECIFIED BACK PAIN LATERALITY: ICD-10-CM

## 2017-12-11 DIAGNOSIS — E55.9 VITAMIN D DEFICIENCY: ICD-10-CM

## 2017-12-11 DIAGNOSIS — J44.9 CHRONIC OBSTRUCTIVE PULMONARY DISEASE, UNSPECIFIED COPD TYPE (HCC): ICD-10-CM

## 2017-12-11 DIAGNOSIS — G89.29 CHRONIC LOW BACK PAIN WITHOUT SCIATICA, UNSPECIFIED BACK PAIN LATERALITY: ICD-10-CM

## 2017-12-11 PROCEDURE — 99212 OFFICE O/P EST SF 10 MIN: CPT | Performed by: INTERNAL MEDICINE

## 2017-12-11 PROCEDURE — 99214 OFFICE O/P EST MOD 30 MIN: CPT | Performed by: INTERNAL MEDICINE

## 2017-12-11 RX ORDER — HYDROCODONE BITARTRATE AND ACETAMINOPHEN 10; 325 MG/1; MG/1
1 TABLET ORAL EVERY 8 HOURS PRN
Qty: 30 TABLET | Refills: 0 | Status: SHIPPED | OUTPATIENT
Start: 2017-12-11 | End: 2018-04-09

## 2017-12-11 NOTE — PROGRESS NOTES
HPI:    Patient ID: Tracy Vera is a 52year old male.     HPI    Follow up  Facial rash request derm  Refill norco  Chronic pain lower back  Only take norco occasionally  Down to 1/2/PPD cigarette    Feeling well   Working part time    /69 (BP Locat External Shampoo Apply to scalp 2 times per week.  Disp: 120 mL Rfl: 2     Allergies:No Known Allergies    HISTORY:  Past Medical History:   Diagnosis Date   • Bell's palsy    • [facility]    • Hyperlipidemia    • Mas mood and affect.  His behavior is normal.            ASSESSMENT/PLAN:   (L21.9) Seborrheic dermatitis  (primary encounter diagnosis)  Plan: DERM - INTERNAL        Mild fragrance free soap    (M54.5,  G89.29) Chronic low back pain without sciatica, unspecifi

## 2018-01-19 ENCOUNTER — OFFICE VISIT (OUTPATIENT)
Dept: DERMATOLOGY CLINIC | Facility: CLINIC | Age: 48
End: 2018-01-19

## 2018-01-19 DIAGNOSIS — L21.9 SEBORRHEIC DERMATITIS: ICD-10-CM

## 2018-01-19 DIAGNOSIS — L72.0 EPIDERMAL CYST: ICD-10-CM

## 2018-01-19 DIAGNOSIS — L71.9 ROSACEA: Primary | ICD-10-CM

## 2018-01-19 PROCEDURE — 99213 OFFICE O/P EST LOW 20 MIN: CPT | Performed by: DERMATOLOGY

## 2018-01-19 PROCEDURE — 99212 OFFICE O/P EST SF 10 MIN: CPT | Performed by: DERMATOLOGY

## 2018-01-19 RX ORDER — KETOCONAZOLE 20 MG/ML
SHAMPOO TOPICAL
Qty: 120 ML | Refills: 2 | Status: SHIPPED | OUTPATIENT
Start: 2018-01-19 | End: 2018-02-24

## 2018-01-19 RX ORDER — SULFAMETHOXAZOLE AND TRIMETHOPRIM 800; 160 MG/1; MG/1
TABLET ORAL
Refills: 0 | COMMUNITY
Start: 2018-01-08 | End: 2018-02-24

## 2018-01-29 NOTE — PROGRESS NOTES
Amparo Sanchez is a 52year old male.     Patient presents with:  Lesion: LOV 09/25/17 pt was seen for scaly patches on his face, pt here for follow up pt was given Metronidazole cream at last visit states that the cream is helpful but when he sweats it get Allergies:   No Known Allergies    Past Medical History:  No date: Bell's palsy  No date: COPD (chronic obstructive pulmonary disease) (*  No date: Hyperlipidemia  No date: Mass in neck  No date:  Mass of right side of neck  No date: Vitamin D deficienc or different no unusual exposures. No ocular symptoms itching, discharge irritation. Triggers:       Physical examination:  Well-developed well-nourished woman alert oriented in no acute distress.       Exam performed, including scalp, head, neck, face,na in this encounter. Meds & Refills for this Visit:   Signed Prescriptions Disp Refills    hydrocortisone 2.5 % External Cream 30 g 2      Sig: Apply every morning and evening.       Ketoconazole 2 % External Shampoo 120 mL 2      Sig: Apply to scalp 2 t

## 2018-01-30 ENCOUNTER — OFFICE VISIT (OUTPATIENT)
Dept: SURGERY | Facility: CLINIC | Age: 48
End: 2018-01-30

## 2018-01-30 VITALS — BODY MASS INDEX: 30 KG/M2 | WEIGHT: 225 LBS

## 2018-01-30 DIAGNOSIS — L72.3 SEBACEOUS CYST: Primary | ICD-10-CM

## 2018-01-30 PROCEDURE — 99212 OFFICE O/P EST SF 10 MIN: CPT | Performed by: SURGERY

## 2018-01-30 PROCEDURE — 99214 OFFICE O/P EST MOD 30 MIN: CPT | Performed by: SURGERY

## 2018-01-30 NOTE — H&P
History and Physical      Beryl Neville is a 52year old male. HPI   Patient presents with:  Cyst: 2 cysts on back.   Cyst on left upper back is painful to the left shoulder and radiating to the left chest.  Was seen in the ED, but was told it wasn't \" Years of education:                 Number of children: 0             Occupational History  Occupation          Employer            Comment                                                 unemployed    Social History Main Topics    Smoking status cysts on back and neck. DIAGNOSTICS      ASSESSMENT/PLAN   Assessment   Sebaceous cyst  (primary encounter diagnosis)    53 y/o male with recurrent abscess of a probable epidermal inclusion cyst of the L mid medial back.   He also has a similar low gr

## 2018-02-27 ENCOUNTER — TELEPHONE (OUTPATIENT)
Dept: SURGERY | Facility: CLINIC | Age: 48
End: 2018-02-27

## 2018-02-27 NOTE — TELEPHONE ENCOUNTER
Please provide CPT code for this procedure. Has anyone contacted the insurance company about prior [address]?

## 2018-02-28 ENCOUNTER — HOSPITAL ENCOUNTER (OUTPATIENT)
Facility: HOSPITAL | Age: 48
Setting detail: HOSPITAL OUTPATIENT SURGERY
Discharge: HOME | End: 2018-02-28
Attending: SURGERY | Admitting: SURGERY
Payer: MEDICAID

## 2018-02-28 ENCOUNTER — SURGERY (OUTPATIENT)
Age: 48
End: 2018-02-28

## 2018-02-28 VITALS
SYSTOLIC BLOOD PRESSURE: 127 MMHG | HEIGHT: 73 IN | DIASTOLIC BLOOD PRESSURE: 94 MMHG | WEIGHT: 225 LBS | OXYGEN SATURATION: 96 % | BODY MASS INDEX: 29.82 KG/M2 | HEART RATE: 56 BPM

## 2018-02-28 PROCEDURE — 0JQ70ZZ REPAIR BACK SUBCUTANEOUS TISSUE AND FASCIA, OPEN APPROACH: ICD-10-PCS | Performed by: SURGERY

## 2018-02-28 PROCEDURE — 11402 EXC TR-EXT B9+MARG 1.1-2 CM: CPT | Performed by: SURGERY

## 2018-02-28 PROCEDURE — 0JB73ZZ EXCISION OF BACK SUBCUTANEOUS TISSUE AND FASCIA, PERCUTANEOUS APPROACH: ICD-10-PCS | Performed by: SURGERY

## 2018-02-28 PROCEDURE — 12032 INTMD RPR S/A/T/EXT 2.6-7.5: CPT | Performed by: SURGERY

## 2018-02-28 RX ORDER — BUPIVACAINE HYDROCHLORIDE AND EPINEPHRINE 5; 5 MG/ML; UG/ML
INJECTION, SOLUTION PERINEURAL AS NEEDED
Status: DISCONTINUED | OUTPATIENT
Start: 2018-02-28 | End: 2018-02-28 | Stop reason: HOSPADM

## 2018-02-28 RX ORDER — HYDROCODONE BITARTRATE AND ACETAMINOPHEN 5; 325 MG/1; MG/1
2 TABLET ORAL EVERY 6 HOURS PRN
Status: DISCONTINUED | OUTPATIENT
Start: 2018-02-28 | End: 2018-02-28

## 2018-02-28 NOTE — H&P (VIEW-ONLY)
History and Physical      Tracy Vera is a 52year old male. HPI   Patient presents with:  Cyst: 2 cysts on back.   Cyst on left upper back is painful to the left shoulder and radiating to the left chest.  Was seen in the ED, but was told it wasn't \" Years of education:                 Number of children: 0             Occupational History  Occupation          Employer            Comment                                                 unemployed    Social History Main Topics    Smoking status cysts on back and neck. DIAGNOSTICS      ASSESSMENT/PLAN   Assessment   Sebaceous cyst  (primary encounter diagnosis)    53 y/o male with recurrent abscess of a probable epidermal inclusion cyst of the L mid medial back.   He also has a similar low gr

## 2018-02-28 NOTE — INTERVAL H&P NOTE
Pre-op Diagnosis: Skin cysts two on back     The above referenced H&P was reviewed by Joseph Yang MD on 2/28/2018, the patient was examined and no significant changes have occurred in the patient's condition since the H&P was performed.   I discussed with

## 2018-02-28 NOTE — BRIEF OP NOTE
Pre-Operative Diagnosis: Skin cysts two on back      Post-Operative Diagnosis: Skin cysts two on back      Procedure Performed:   Procedure(s):  Excisional biopsy of two skin cysts on back     Surgeon(s) and Role:     Manuel Austin MD - Primary    As

## 2018-02-28 NOTE — OPERATIVE REPORT
[facility]    PATIENT'S NAME: Ninoska Black   ATTENDING PHYSICIAN: Muna Salvador MD   OPERATING PHYSICIAN: Muna Salvador MD   PATIENT ACCOUNT#:   [id number]    LOCATION:  [address]  MEDICAL RECORD #:   [id number]       DATE OF BI It was closed in layers with 3-0 Vicryl suture and then a running 4-0 nylon suture. Elliptical skin incision was performed in the right lateral back over the cystic mass to include the pore.   Dissection continued sharply into the subcutaneous tissue usi

## 2018-03-12 ENCOUNTER — TELEPHONE (OUTPATIENT)
Dept: SURGERY | Facility: CLINIC | Age: 48
End: 2018-03-12

## 2018-03-12 ENCOUNTER — OFFICE VISIT (OUTPATIENT)
Dept: SURGERY | Facility: CLINIC | Age: 48
End: 2018-03-12

## 2018-03-12 DIAGNOSIS — L72.0 EPIDERMAL INCLUSION CYST: Primary | ICD-10-CM

## 2018-03-12 PROCEDURE — 99024 POSTOP FOLLOW-UP VISIT: CPT | Performed by: SURGERY

## 2018-03-12 PROCEDURE — 99212 OFFICE O/P EST SF 10 MIN: CPT | Performed by: SURGERY

## 2018-03-12 NOTE — TELEPHONE ENCOUNTER
\"No precert required\" per Merlene Mock at [facility] for procedure on 02/28/2018, reference # [id number].

## 2018-03-12 NOTE — PROGRESS NOTES
Postoperative Patient Follow-up      3/12/2018    Johana Mondragon 52year old      HPI  Patient presents with:  Post-Op: First post op.  S/P Excisional biopsy left mid back skin cyst (1.1 cm diameter), layered closure (2.1 cm length), and excisional biopsy,

## 2018-04-09 ENCOUNTER — OFFICE VISIT (OUTPATIENT)
Dept: INTERNAL MEDICINE CLINIC | Facility: CLINIC | Age: 48
End: 2018-04-09

## 2018-04-09 ENCOUNTER — HOSPITAL ENCOUNTER (OUTPATIENT)
Dept: GENERAL RADIOLOGY | Age: 48
Discharge: HOME | End: 2018-04-09
Attending: INTERNAL MEDICINE
Payer: MEDICAID

## 2018-04-09 VITALS
HEIGHT: 73 IN | WEIGHT: 232 LBS | HEART RATE: 84 BPM | DIASTOLIC BLOOD PRESSURE: 66 MMHG | TEMPERATURE: 98 F | BODY MASS INDEX: 30.75 KG/M2 | SYSTOLIC BLOOD PRESSURE: 109 MMHG

## 2018-04-09 DIAGNOSIS — G89.29 CHRONIC PAIN OF LEFT KNEE: ICD-10-CM

## 2018-04-09 DIAGNOSIS — G89.29 CHRONIC PAIN OF LEFT KNEE: Primary | ICD-10-CM

## 2018-04-09 DIAGNOSIS — M25.562 CHRONIC PAIN OF LEFT KNEE: Primary | ICD-10-CM

## 2018-04-09 DIAGNOSIS — M25.562 CHRONIC PAIN OF LEFT KNEE: ICD-10-CM

## 2018-04-09 PROCEDURE — 73562 X-RAY EXAM OF KNEE 3: CPT | Performed by: INTERNAL MEDICINE

## 2018-04-09 PROCEDURE — 99212 OFFICE O/P EST SF 10 MIN: CPT | Performed by: INTERNAL MEDICINE

## 2018-04-09 PROCEDURE — 99213 OFFICE O/P EST LOW 20 MIN: CPT | Performed by: INTERNAL MEDICINE

## 2018-04-09 RX ORDER — HYDROCODONE BITARTRATE AND ACETAMINOPHEN 10; 325 MG/1; MG/1
1 TABLET ORAL EVERY 8 HOURS PRN
Qty: 30 TABLET | Refills: 0 | Status: SHIPPED | OUTPATIENT
Start: 2018-04-09 | End: 2018-06-07 | Stop reason: ALTCHOICE

## 2018-04-09 RX ORDER — HYDROCODONE BITARTRATE AND ACETAMINOPHEN 10; 325 MG/1; MG/1
1 TABLET ORAL EVERY 8 HOURS PRN
Qty: 30 TABLET | Refills: 0 | Status: SHIPPED | OUTPATIENT
Start: 2018-04-09 | End: 2018-04-09

## 2018-04-10 NOTE — PROGRESS NOTES
HPI:    Patient ID: Dong Cruz is a 52year old male.     HPI    Follow up   Per pt mod  Left knee pain   No  Injury  Pain wi worse with de german gsound    On norco for left lateral  Thing pain  Was seen by 6 doctors and could not figure out [employer] • Cancer Mother    • Stroke Neg    • Heart Disease Neg       Social History: Smoking status: Current Every Day Smoker                                                   Packs/day: 1.00      Years: 20.00     Smokeless tobacco: Current User

## 2018-04-14 ENCOUNTER — TELEPHONE (OUTPATIENT)
Dept: OTHER | Age: 48
End: 2018-04-14

## 2018-04-14 NOTE — TELEPHONE ENCOUNTER
----- Message from Gilbert Cameron, [address] sent at 4/13/2018  8:22 AM CDT -----  Routed to RN      Notes recorded by Eloisa Monroy MD on 4/13/2018 at 1:44 AM CDT  Send letter and copy of test result.   Arthritis with knee effusion   Follow up with orthopedi

## 2018-04-14 NOTE — TELEPHONE ENCOUNTER
[facility] Referral to Dr. Ros Mejia #[phone] for appt.

## 2018-05-01 ENCOUNTER — OFFICE VISIT (OUTPATIENT)
Dept: ORTHOPEDICS CLINIC | Facility: CLINIC | Age: 48
End: 2018-05-01

## 2018-05-01 DIAGNOSIS — M25.562 ACUTE PAIN OF LEFT KNEE: Primary | ICD-10-CM

## 2018-05-01 PROCEDURE — 99243 OFF/OP CNSLTJ NEW/EST LOW 30: CPT | Performed by: ORTHOPAEDIC SURGERY

## 2018-05-01 PROCEDURE — 99212 OFFICE O/P EST SF 10 MIN: CPT | Performed by: ORTHOPAEDIC SURGERY

## 2018-05-01 RX ORDER — MELOXICAM 15 MG/1
15 TABLET ORAL DAILY
Qty: 30 TABLET | Refills: 0 | Status: SHIPPED | OUTPATIENT
Start: 2018-05-01 | End: 2018-05-23

## 2018-05-01 NOTE — PROGRESS NOTES
5/1/2018  Garrick Escort  5/26/1970  52year old   male  Rios Alex MD    HPI:   Patient presents with:  Knee Pain: Left - onset 1.5 mo ago - no injury - has sbapping in the knee - has pain on the medial aspect of the knee and is worse with bendin Disease Neg       Smoking status: Current Every Day Smoker                                                   Packs/day: 1.00      Years: 20.00     Smokeless tobacco: Current User                    Alcohol use: Yes           0.0 oz/week     Comment: kenyetta any intraocular pathology. The patient was given a prescription for meloxicam to take as an anti-inflammatory. Patient will follow-up after the MRI has been obtained so we may obtain a treatment plan for him.     All of their questions were answered and t

## 2018-05-03 ENCOUNTER — TELEPHONE (OUTPATIENT)
Dept: ORTHOPEDICS CLINIC | Facility: CLINIC | Age: 48
End: 2018-05-03

## 2018-05-12 ENCOUNTER — HOSPITAL ENCOUNTER (OUTPATIENT)
Dept: MRI IMAGING | Age: 48
Discharge: HOME | End: 2018-05-12
Attending: ORTHOPAEDIC SURGERY
Payer: MEDICAID

## 2018-05-12 DIAGNOSIS — M25.562 ACUTE PAIN OF LEFT KNEE: ICD-10-CM

## 2018-05-12 PROCEDURE — 73721 MRI JNT OF LWR EXTRE W/O DYE: CPT | Performed by: ORTHOPAEDIC SURGERY

## 2018-05-15 ENCOUNTER — TELEPHONE (OUTPATIENT)
Dept: ORTHOPEDICS CLINIC | Facility: CLINIC | Age: 48
End: 2018-05-15

## 2018-05-15 NOTE — TELEPHONE ENCOUNTER
Call to [facility]. Cell phone. Unablr to leave message Voice mail box not set up  Call to preferred phone number. Pt's mother answered. Left message to ammy Atkinson set u[p an appointment after his MRI.  Phone number also given  Verbalizes understanding

## 2018-05-18 ENCOUNTER — OFFICE VISIT (OUTPATIENT)
Dept: ORTHOPEDICS CLINIC | Facility: CLINIC | Age: 48
End: 2018-05-18

## 2018-05-18 DIAGNOSIS — S83.242A ACUTE MEDIAL MENISCUS TEAR OF LEFT KNEE, INITIAL ENCOUNTER: Primary | ICD-10-CM

## 2018-05-18 PROCEDURE — 99213 OFFICE O/P EST LOW 20 MIN: CPT | Performed by: ORTHOPAEDIC SURGERY

## 2018-05-18 PROCEDURE — 99212 OFFICE O/P EST SF 10 MIN: CPT | Performed by: ORTHOPAEDIC SURGERY

## 2018-05-18 NOTE — H&P
5/18/2018  Independence Link  5/26/1970  52year old   male  Susi Navarro MD    HPI:   Patient presents with:  Test Results: MRI left knee     This is a pleasant 71-year-old male who comes in today for repeat evaluation of left knee.   He recently had a is non-tender and atraumatic with the exception of their left lower extremity. The patient's skin is intact and compartments are soft. The patient's lower extremities are warm and pink with brisk capillary refill and 2+ distal pulses.   Sensation is intac agreement with the treatment plan.

## 2018-05-21 ENCOUNTER — TELEPHONE (OUTPATIENT)
Dept: ORTHOPEDICS CLINIC | Facility: CLINIC | Age: 48
End: 2018-05-21

## 2018-05-21 NOTE — TELEPHONE ENCOUNTER
Called pt and he states he tried to call [facility] [employer] [facility] and VM says she will be out of town for a week. I informed him he may have to wait until she gets back, but I also gave him kiara alvarado  # to see if she can schedule in the mean time.

## 2018-05-23 ENCOUNTER — OFFICE VISIT (OUTPATIENT)
Dept: INTERNAL MEDICINE CLINIC | Facility: CLINIC | Age: 48
End: 2018-05-23

## 2018-05-23 VITALS
HEIGHT: 73 IN | SYSTOLIC BLOOD PRESSURE: 107 MMHG | WEIGHT: 235 LBS | TEMPERATURE: 98 F | BODY MASS INDEX: 31.14 KG/M2 | DIASTOLIC BLOOD PRESSURE: 67 MMHG | HEART RATE: 78 BPM

## 2018-05-23 DIAGNOSIS — E55.9 VITAMIN D DEFICIENCY: ICD-10-CM

## 2018-05-23 DIAGNOSIS — E78.5 HYPERLIPIDEMIA, UNSPECIFIED HYPERLIPIDEMIA TYPE: ICD-10-CM

## 2018-05-23 DIAGNOSIS — Z01.818 PREOP EXAM FOR INTERNAL MEDICINE: Primary | ICD-10-CM

## 2018-05-23 DIAGNOSIS — F17.200 SMOKING: ICD-10-CM

## 2018-05-23 DIAGNOSIS — J44.9 CHRONIC OBSTRUCTIVE PULMONARY DISEASE, UNSPECIFIED COPD TYPE (HCC): ICD-10-CM

## 2018-05-23 DIAGNOSIS — E66.9 OBESITY (BMI 30.0-34.9): ICD-10-CM

## 2018-05-23 PROCEDURE — 99214 OFFICE O/P EST MOD 30 MIN: CPT | Performed by: INTERNAL MEDICINE

## 2018-05-23 PROCEDURE — 99212 OFFICE O/P EST SF 10 MIN: CPT | Performed by: INTERNAL MEDICINE

## 2018-05-23 NOTE — PROGRESS NOTES
HPI:    Patient ID: Hiren Gayle is a 52year old male.     HPI    PreOP Clearance  Arthroscopic surgery left knee   Date to be determined  Dr Irais Reyes    Addendum: 6/3/18     Panel 1     Surgeon Role   Dino Barrios MD Primary    Proced light-headedness and headaches. Hematological: Negative for adenopathy. Does not bruise/bleed easily. Psychiatric/Behavioral: Negative for agitation and behavioral problems.      Component      Latest Ref Rng & Units 5/31/2018   Glucose      70 - 99 mg/ Negative mg/dL 30 (A)   GLUCOSE (URINE DIPSTICK)      Negative mg/dL Negative   KETONES (URINE DIPSTICK)      Negative mg/dL Negative   BILIRUBIN      Negative Negative   OCCULT BLOOD      Negative Negative   NITRITE, URINE      Negative Negative   UROBILI Allergies    HISTORY:  Past Medical History:   Diagnosis Date   • Bell's palsy    • COPD (chronic obstructive pulmonary disease) ([facility] [address]    • Hyperlipidemia    • Vitamin D deficiency       Past Surgical History:  2005: ANKLE FRACTURE SURGERY Right  2016: DEN rash noted. He is not diaphoretic. No erythema. Psychiatric: He has a normal mood and affect.  His behavior is normal.            ASSESSMENT/PLAN:   (Z01.818) Preop exam for internal medicine  (primary encounter diagnosis)  Plan: CBC WITH DIFFERENTIAL WIT

## 2018-05-31 ENCOUNTER — TELEPHONE (OUTPATIENT)
Dept: ORTHOPEDICS CLINIC | Facility: CLINIC | Age: 48
End: 2018-05-31

## 2018-05-31 ENCOUNTER — APPOINTMENT (OUTPATIENT)
Dept: LAB | Age: 48
End: 2018-05-31
Attending: INTERNAL MEDICINE
Payer: MEDICAID

## 2018-05-31 ENCOUNTER — LAB ENCOUNTER (OUTPATIENT)
Dept: LAB | Age: 48
End: 2018-05-31
Attending: INTERNAL MEDICINE
Payer: MEDICAID

## 2018-05-31 ENCOUNTER — HOSPITAL ENCOUNTER (OUTPATIENT)
Dept: GENERAL RADIOLOGY | Age: 48
Discharge: HOME | End: 2018-05-31
Attending: INTERNAL MEDICINE
Payer: MEDICAID

## 2018-05-31 DIAGNOSIS — Z01.818 PREOP EXAM FOR INTERNAL MEDICINE: ICD-10-CM

## 2018-05-31 PROCEDURE — 80053 COMPREHEN METABOLIC PANEL: CPT

## 2018-05-31 PROCEDURE — 71046 X-RAY EXAM CHEST 2 VIEWS: CPT | Performed by: INTERNAL MEDICINE

## 2018-05-31 PROCEDURE — 81001 URINALYSIS AUTO W/SCOPE: CPT

## 2018-05-31 PROCEDURE — 87641 MR-STAPH DNA AMP PROBE: CPT

## 2018-05-31 PROCEDURE — 93005 ELECTROCARDIOGRAM TRACING: CPT

## 2018-05-31 PROCEDURE — 93010 ELECTROCARDIOGRAM REPORT: CPT | Performed by: INTERNAL MEDICINE

## 2018-05-31 PROCEDURE — 36415 COLL VENOUS BLD VENIPUNCTURE: CPT

## 2018-05-31 PROCEDURE — 85025 COMPLETE CBC W/AUTO DIFF WBC: CPT

## 2018-05-31 NOTE — TELEPHONE ENCOUNTER
Surgery with Dr Christianne Sexton on 6-14-18  [facility]  Left knee scope/PMM  [address]    Diagnosis code [address]  Referral in system  Processing per managed care dept  Status : pending

## 2018-06-06 NOTE — TELEPHONE ENCOUNTER
Phone message from managed care dept:  Kaykay Altman yesterday afternoon. Insurance denial for surgery with Dr. Pierson Hole  Dr. Tano Daley made aware.    Requesting appeals processing to begin    Call today to Jimmy 1 aware of insurance denial of his

## 2018-06-06 NOTE — TELEPHONE ENCOUNTER
Call to Freddie hCandler family   REquesting to start appeal process  Spoke to representative [facility] Ellie  Case number  [id number]

## 2018-06-08 NOTE — TELEPHONE ENCOUNTER
Signature from Dr. Joseline Almaguer on Appointment of representation form. Clinical faxed as requested for appeals on surgery denial [phone].

## 2018-06-12 ENCOUNTER — TELEPHONE (OUTPATIENT)
Dept: ORTHOPEDICS CLINIC | Facility: CLINIC | Age: 48
End: 2018-06-12

## 2018-06-12 NOTE — TELEPHONE ENCOUNTER
Please let patient know that if surgery is not approved with his insurance, sx will be cancelled and rescheduled

## 2018-06-12 NOTE — TELEPHONE ENCOUNTER
Called [facility] community  REquested peer to peer  Set up for 5:30 today    Dr. Radha Berumen called from [facility] with Dr. Delfino Remy states he was to approve surgery. Will confirm tomorrow.

## 2018-06-13 RX ORDER — CEFAZOLIN SODIUM/WATER 2 G/20 ML
2 SYRINGE (ML) INTRAVENOUS ONCE
Status: DISCONTINUED | OUTPATIENT
Start: 2018-06-13 | End: 2018-06-13

## 2018-06-13 NOTE — TELEPHONE ENCOUNTER
Surgery for tomorrow is on. Received authorization. Call to Dr. Yas Leon  Call to  of surgery  Call to pre admission testing  Call to Kaylie Razo.

## 2018-06-13 NOTE — TELEPHONE ENCOUNTER
Cynthia He states pts joint sx was approved authorization number is [id number]. For any questions pls call [phone] option 3.

## 2018-06-13 NOTE — TELEPHONE ENCOUNTER
Call to [facility]. Made aware of surgery cancellation. Due to being in such pain can he have a prescription for meloxicam.    Explained that if we get surgery approval Dr. Luiz Sher may be able to reschedule his procedure in the next few days.   Pt states w

## 2018-06-13 NOTE — TELEPHONE ENCOUNTER
[place] [facility] [place] client number [phone]    Called with ticket number given. [id number].     Unable to give me an authoriziation for surgery    Please advise on cancelation

## 2018-06-13 NOTE — TELEPHONE ENCOUNTER
Auth still showing as denied. Pt still on schedule for tomorrow 6/14/18. Please advise by later today if patient will be cancelled.

## 2018-06-13 NOTE — TELEPHONE ENCOUNTER
Called twice and was disconnectd 2 times from [facility] a third time 11:00 and on the phone for processing until 12:45  Working with [facility] for surgery authorzation  Case esculated to the highest standing for rapid processing  Should hear with

## 2018-06-14 ENCOUNTER — ANESTHESIA EVENT (OUTPATIENT)
Dept: SURGERY | Facility: HOSPITAL | Age: 48
End: 2018-06-14
Payer: MEDICAID

## 2018-06-14 ENCOUNTER — SURGERY (OUTPATIENT)
Age: 48
End: 2018-06-14

## 2018-06-14 ENCOUNTER — TELEPHONE (OUTPATIENT)
Dept: ORTHOPEDICS CLINIC | Facility: CLINIC | Age: 48
End: 2018-06-14

## 2018-06-14 ENCOUNTER — ANESTHESIA (OUTPATIENT)
Dept: SURGERY | Facility: HOSPITAL | Age: 48
End: 2018-06-14
Payer: MEDICAID

## 2018-06-14 ENCOUNTER — HOSPITAL ENCOUNTER (OUTPATIENT)
Facility: HOSPITAL | Age: 48
Setting detail: HOSPITAL OUTPATIENT SURGERY
Discharge: HOME | End: 2018-06-14
Attending: ORTHOPAEDIC SURGERY | Admitting: ORTHOPAEDIC SURGERY
Payer: MEDICAID

## 2018-06-14 VITALS
TEMPERATURE: 97 F | RESPIRATION RATE: 14 BRPM | WEIGHT: 225.69 LBS | SYSTOLIC BLOOD PRESSURE: 137 MMHG | OXYGEN SATURATION: 96 % | DIASTOLIC BLOOD PRESSURE: 97 MMHG | HEART RATE: 68 BPM | HEIGHT: 73 IN | BODY MASS INDEX: 29.91 KG/M2

## 2018-06-14 DIAGNOSIS — S83.242A ACUTE MEDIAL MENISCUS TEAR OF LEFT KNEE, INITIAL ENCOUNTER: ICD-10-CM

## 2018-06-14 PROCEDURE — 0SBD4ZZ EXCISION OF LEFT KNEE JOINT, PERCUTANEOUS ENDOSCOPIC APPROACH: ICD-10-PCS | Performed by: ORTHOPAEDIC SURGERY

## 2018-06-14 RX ORDER — HYDROMORPHONE HYDROCHLORIDE 1 MG/ML
0.6 INJECTION, SOLUTION INTRAMUSCULAR; INTRAVENOUS; SUBCUTANEOUS EVERY 5 MIN PRN
Status: DISCONTINUED | OUTPATIENT
Start: 2018-06-14 | End: 2018-06-14

## 2018-06-14 RX ORDER — SODIUM CHLORIDE, SODIUM LACTATE, POTASSIUM CHLORIDE, CALCIUM CHLORIDE 600; 310; 30; 20 MG/100ML; MG/100ML; MG/100ML; MG/100ML
INJECTION, SOLUTION INTRAVENOUS CONTINUOUS
Status: DISCONTINUED | OUTPATIENT
Start: 2018-06-14 | End: 2018-06-14

## 2018-06-14 RX ORDER — GLYCOPYRROLATE 0.2 MG/ML
INJECTION INTRAMUSCULAR; INTRAVENOUS AS NEEDED
Status: DISCONTINUED | OUTPATIENT
Start: 2018-06-14 | End: 2018-06-14 | Stop reason: SURG

## 2018-06-14 RX ORDER — HYDROCODONE BITARTRATE AND ACETAMINOPHEN 5; 325 MG/1; MG/1
2 TABLET ORAL AS NEEDED
Status: COMPLETED | OUTPATIENT
Start: 2018-06-14 | End: 2018-06-14

## 2018-06-14 RX ORDER — MELOXICAM 15 MG/1
15 TABLET ORAL DAILY
Qty: 30 TABLET | Refills: 0 | Status: SHIPPED | OUTPATIENT
Start: 2018-06-14 | End: 2021-04-13 | Stop reason: ALTCHOICE

## 2018-06-14 RX ORDER — CEFAZOLIN SODIUM/WATER 2 G/20 ML
2 SYRINGE (ML) INTRAVENOUS ONCE
Status: COMPLETED | OUTPATIENT
Start: 2018-06-14 | End: 2018-06-14

## 2018-06-14 RX ORDER — HYDROCODONE BITARTRATE AND ACETAMINOPHEN 5; 325 MG/1; MG/1
1 TABLET ORAL AS NEEDED
Status: COMPLETED | OUTPATIENT
Start: 2018-06-14 | End: 2018-06-14

## 2018-06-14 RX ORDER — FAMOTIDINE 20 MG/1
20 TABLET ORAL ONCE
Status: COMPLETED | OUTPATIENT
Start: 2018-06-14 | End: 2018-06-14

## 2018-06-14 RX ORDER — NALOXONE HYDROCHLORIDE 0.4 MG/ML
80 INJECTION, SOLUTION INTRAMUSCULAR; INTRAVENOUS; SUBCUTANEOUS AS NEEDED
Status: DISCONTINUED | OUTPATIENT
Start: 2018-06-14 | End: 2018-06-14

## 2018-06-14 RX ORDER — ONDANSETRON 2 MG/ML
INJECTION INTRAMUSCULAR; INTRAVENOUS AS NEEDED
Status: DISCONTINUED | OUTPATIENT
Start: 2018-06-14 | End: 2018-06-14 | Stop reason: SURG

## 2018-06-14 RX ORDER — ASPIRIN 325 MG
325 TABLET ORAL DAILY
Qty: 21 TABLET | Refills: 0 | Status: SHIPPED | OUTPATIENT
Start: 2018-06-14 | End: 2021-04-13

## 2018-06-14 RX ORDER — HYDROMORPHONE HYDROCHLORIDE 1 MG/ML
0.2 INJECTION, SOLUTION INTRAMUSCULAR; INTRAVENOUS; SUBCUTANEOUS EVERY 5 MIN PRN
Status: DISCONTINUED | OUTPATIENT
Start: 2018-06-14 | End: 2018-06-14

## 2018-06-14 RX ORDER — METOCLOPRAMIDE 10 MG/1
10 TABLET ORAL ONCE
Status: COMPLETED | OUTPATIENT
Start: 2018-06-14 | End: 2018-06-14

## 2018-06-14 RX ORDER — LIDOCAINE HYDROCHLORIDE 10 MG/ML
INJECTION, SOLUTION EPIDURAL; INFILTRATION; INTRACAUDAL; PERINEURAL AS NEEDED
Status: DISCONTINUED | OUTPATIENT
Start: 2018-06-14 | End: 2018-06-14 | Stop reason: SURG

## 2018-06-14 RX ORDER — HYDROMORPHONE HYDROCHLORIDE 1 MG/ML
0.4 INJECTION, SOLUTION INTRAMUSCULAR; INTRAVENOUS; SUBCUTANEOUS EVERY 5 MIN PRN
Status: DISCONTINUED | OUTPATIENT
Start: 2018-06-14 | End: 2018-06-14

## 2018-06-14 RX ORDER — HALOPERIDOL 5 MG/ML
0.25 INJECTION INTRAMUSCULAR ONCE AS NEEDED
Status: DISCONTINUED | OUTPATIENT
Start: 2018-06-14 | End: 2018-06-14

## 2018-06-14 RX ORDER — ONDANSETRON 2 MG/ML
4 INJECTION INTRAMUSCULAR; INTRAVENOUS ONCE AS NEEDED
Status: DISCONTINUED | OUTPATIENT
Start: 2018-06-14 | End: 2018-06-14

## 2018-06-14 RX ORDER — HYDROCODONE BITARTRATE AND ACETAMINOPHEN 5; 325 MG/1; MG/1
1-2 TABLET ORAL EVERY 6 HOURS PRN
Qty: 10 TABLET | Refills: 0 | Status: SHIPPED | OUTPATIENT
Start: 2018-06-14 | End: 2021-04-13

## 2018-06-14 RX ORDER — ACETAMINOPHEN 500 MG
1000 TABLET ORAL ONCE
Status: COMPLETED | OUTPATIENT
Start: 2018-06-14 | End: 2018-06-14

## 2018-06-14 RX ORDER — MIDAZOLAM HYDROCHLORIDE 1 MG/ML
INJECTION INTRAMUSCULAR; INTRAVENOUS AS NEEDED
Status: DISCONTINUED | OUTPATIENT
Start: 2018-06-14 | End: 2018-06-14 | Stop reason: SURG

## 2018-06-14 RX ORDER — ONDANSETRON 2 MG/ML
4 INJECTION INTRAMUSCULAR; INTRAVENOUS EVERY 6 HOURS PRN
Status: CANCELLED | OUTPATIENT
Start: 2018-06-14

## 2018-06-14 RX ORDER — BUPIVACAINE HYDROCHLORIDE AND EPINEPHRINE 2.5; 5 MG/ML; UG/ML
INJECTION, SOLUTION INFILTRATION; PERINEURAL AS NEEDED
Status: DISCONTINUED | OUTPATIENT
Start: 2018-06-14 | End: 2018-06-14 | Stop reason: HOSPADM

## 2018-06-14 RX ORDER — DEXAMETHASONE SODIUM PHOSPHATE 4 MG/ML
VIAL (ML) INJECTION AS NEEDED
Status: DISCONTINUED | OUTPATIENT
Start: 2018-06-14 | End: 2018-06-14 | Stop reason: SURG

## 2018-06-14 RX ADMIN — DEXAMETHASONE SODIUM PHOSPHATE 4 MG: 4 MG/ML VIAL (ML) INJECTION at 16:44:00

## 2018-06-14 RX ADMIN — MIDAZOLAM HYDROCHLORIDE 2 MG: 1 INJECTION INTRAMUSCULAR; INTRAVENOUS at 16:31:00

## 2018-06-14 RX ADMIN — CEFAZOLIN SODIUM/WATER 2 G: 2 G/20 ML SYRINGE (ML) INTRAVENOUS at 16:42:00

## 2018-06-14 RX ADMIN — SODIUM CHLORIDE, SODIUM LACTATE, POTASSIUM CHLORIDE, CALCIUM CHLORIDE: 600; 310; 30; 20 INJECTION, SOLUTION INTRAVENOUS at 17:10:00

## 2018-06-14 RX ADMIN — GLYCOPYRROLATE 0.2 MG: 0.2 INJECTION INTRAMUSCULAR; INTRAVENOUS at 16:31:00

## 2018-06-14 RX ADMIN — SODIUM CHLORIDE, SODIUM LACTATE, POTASSIUM CHLORIDE, CALCIUM CHLORIDE: 600; 310; 30; 20 INJECTION, SOLUTION INTRAVENOUS at 16:29:00

## 2018-06-14 RX ADMIN — LIDOCAINE HYDROCHLORIDE 50 MG: 10 INJECTION, SOLUTION EPIDURAL; INFILTRATION; INTRACAUDAL; PERINEURAL at 16:32:00

## 2018-06-14 RX ADMIN — ONDANSETRON 4 MG: 2 INJECTION INTRAMUSCULAR; INTRAVENOUS at 16:44:00

## 2018-06-14 NOTE — BRIEF OP NOTE
Pre-Operative Diagnosis: Acute medial meniscus tear of left knee, initial encounter [S83.242A]     Post-Operative Diagnosis: Acute medial meniscus tear of left knee, initial encounter [S83.242A]   Acute lateral meniscus tear left knee     Procedure Perform

## 2018-06-14 NOTE — INTERVAL H&P NOTE
Pre-op Diagnosis: Acute medial meniscus tear of left knee, initial encounter [S83.242A]    The above referenced H&P was reviewed by Terry Garcias MD on 6/14/2018, the patient was examined and no significant changes have occurred in the patient's cond

## 2018-06-14 NOTE — ANESTHESIA PREPROCEDURE EVALUATION
Anesthesia PreOp Note    HPI:     Garrick Burger is a 50year old male who presents for preoperative consultation requested by: Michael Lennon MD    Date of Surgery: 6/14/2018    Procedure(s):  KNEE ARTHROSCOPY  Indication: Acute medial meniscus tear Marital status: Single  Spouse name: N/A    Years of education: N/A  Number of children: 0     Occupational History    unemployed     Social History Main Topics   Smoking status: Current Every Day Smoker  1.00 Packs/day  For 20.00 Years     Smokeles (+) neuromuscular disease (Bell's palsy),     GI/Hepatic/Renal - negative ROS     Endo/Other - negative ROS   Abdominal  - normal exam             Anesthesia Plan:   ASA:  2  Plan:   General  Airway:  LMA  Post-op Pain Management: IV analgesics and Oral pa

## 2018-06-14 NOTE — ANESTHESIA POSTPROCEDURE EVALUATION
Patient: Meenakshi Tita    Procedure Summary     Date:  06/14/18 Room / Location:  [address] [place] [address] MAIN OR 11 / [address] [place] [address] MAIN OR    Anesthesia Start:  2141 Anesthesia Stop:  7407    Procedure:  KNEE ARTHROSCOPY (Left Knee) Diagnosis:       Acute medial meniscus tear of lef

## 2018-06-14 NOTE — H&P (VIEW-ONLY)
5/18/2018  Shanna Ibarra  5/26/1970  52year old   male  Janet Galvan MD    HPI:   Patient presents with:  Test Results: MRI left knee     This is a pleasant 30-year-old male who comes in today for repeat evaluation of left knee.   He recently had a is non-tender and atraumatic with the exception of their left lower extremity. The patient's skin is intact and compartments are soft. The patient's lower extremities are warm and pink with brisk capillary refill and 2+ distal pulses.   Sensation is intac agreement with the treatment plan.

## 2018-06-14 NOTE — TELEPHONE ENCOUNTER
Received an appeal decision back from [facility] approving the surgery with Dr Elena Kellogg.      Thank you, Luis

## 2018-06-15 ENCOUNTER — OFFICE VISIT (OUTPATIENT)
Dept: ORTHOPEDICS CLINIC | Facility: CLINIC | Age: 48
End: 2018-06-15

## 2018-06-15 DIAGNOSIS — S83.242A ACUTE MEDIAL MENISCUS TEAR OF LEFT KNEE, INITIAL ENCOUNTER: Primary | ICD-10-CM

## 2018-06-15 PROCEDURE — 99212 OFFICE O/P EST SF 10 MIN: CPT | Performed by: ORTHOPAEDIC SURGERY

## 2018-06-15 PROCEDURE — 99024 POSTOP FOLLOW-UP VISIT: CPT | Performed by: ORTHOPAEDIC SURGERY

## 2018-06-15 NOTE — OPERATIVE REPORT
[facility]    PATIENT'S NAME: Pamella Valle   ATTENDING PHYSICIAN: Adalberto Araujo MD   OPERATING PHYSICIAN: Adalberto Araujo MD   PATIENT ACCOUNT#:   [id number]    LOCATION:  [address] 10  MEDICAL RECORD #:   [id number]       DATE OF performed. Anteromedial and lateral portals were made onto the anterior aspect of the left knee. Diagnostic arthroscopy then ensued. There was fissuring and cracking of the articular cartilage in the superior aspect of the trochlea.   There were no lo 17:11:51  t: 06/14/2018 20:21:13  [facility] [id number]  /

## 2018-06-15 NOTE — PROGRESS NOTES
This is a pleasant 63-year-old male that is 1 day status post left knee arthroscopy and partial medial meniscectomy and chondroplasty of the medial femoral condyle. He has had no fevers, chills, chest pain, shortness of breath.   The patient reports feeling

## 2018-06-20 ENCOUNTER — OFFICE VISIT (OUTPATIENT)
Dept: PHYSICAL THERAPY | Facility: HOSPITAL | Age: 48
End: 2018-06-20
Attending: ORTHOPAEDIC SURGERY
Payer: MEDICAID

## 2018-06-20 DIAGNOSIS — S83.242A ACUTE MEDIAL MENISCUS TEAR OF LEFT KNEE, INITIAL ENCOUNTER: ICD-10-CM

## 2018-06-20 PROCEDURE — 97162 PT EVAL MOD COMPLEX 30 MIN: CPT

## 2018-06-20 NOTE — PROGRESS NOTES
LOWER EXTREMITY EVALUATION:   Referring Physician: Dr. Char Dior  Diagnosis: Acute medial meniscus tear of left knee, initial encounter (P36.691P)      Date of Onset: s/p 6/14/18 Date of Service: 6/20/2018     PATIENT SUMMARY   The patient is an active 50 y medial arch     Palpation: TTP L medial patellar joint line   Sensation: WNL (denies numbness/tingling)    AROM: L knee ext -1 deg ( R 0 deg);  L knee flex 130 deg (R 135 deg)       Accessory motion: mod loss med/lat patellar mobility (2/6) / normal sup/inf due to 1-2 personal factors/comorbidities, 3 body structures involved/activity limitations, and evolving symptoms including changing pain levels.     Education or treatment limitation: None  Rehab Potential:good    FOTO: 68/100 (predicted 73/100)  Current s

## 2018-06-27 ENCOUNTER — OFFICE VISIT (OUTPATIENT)
Dept: PHYSICAL THERAPY | Facility: HOSPITAL | Age: 48
End: 2018-06-27
Attending: ORTHOPAEDIC SURGERY
Payer: MEDICAID

## 2018-06-27 PROCEDURE — 97110 THERAPEUTIC EXERCISES: CPT

## 2018-06-27 NOTE — PROGRESS NOTES
Diagnosis: Acute medial meniscus tear of left knee, initial encounter (J72.163K)      Authorized # of Visits: 8 Insurance: [employer]            Next MD visit: (after therapy)    Fall Risk: standard         Precautions: none    Medi

## 2018-06-29 ENCOUNTER — OFFICE VISIT (OUTPATIENT)
Dept: PHYSICAL THERAPY | Facility: HOSPITAL | Age: 48
End: 2018-06-29
Attending: ORTHOPAEDIC SURGERY
Payer: MEDICAID

## 2018-06-29 PROCEDURE — 97110 THERAPEUTIC EXERCISES: CPT

## 2018-06-29 NOTE — PROGRESS NOTES
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

## 2018-07-02 ENCOUNTER — APPOINTMENT (OUTPATIENT)
Dept: PHYSICAL THERAPY | Facility: HOSPITAL | Age: 48
End: 2018-07-02
Attending: ORTHOPAEDIC SURGERY
Payer: MEDICAID

## 2018-07-05 ENCOUNTER — TELEPHONE (OUTPATIENT)
Dept: PHYSICAL THERAPY | Facility: HOSPITAL | Age: 48
End: 2018-07-05

## 2018-07-06 ENCOUNTER — APPOINTMENT (OUTPATIENT)
Dept: PHYSICAL THERAPY | Facility: HOSPITAL | Age: 48
End: 2018-07-06
Attending: ORTHOPAEDIC SURGERY
Payer: MEDICAID

## 2018-07-11 ENCOUNTER — APPOINTMENT (OUTPATIENT)
Dept: PHYSICAL THERAPY | Facility: HOSPITAL | Age: 48
End: 2018-07-11
Attending: ORTHOPAEDIC SURGERY
Payer: MEDICAID

## 2018-07-13 ENCOUNTER — OFFICE VISIT (OUTPATIENT)
Dept: ORTHOPEDICS CLINIC | Facility: CLINIC | Age: 48
End: 2018-07-13

## 2018-07-13 DIAGNOSIS — S83.242A ACUTE MEDIAL MENISCUS TEAR OF LEFT KNEE, INITIAL ENCOUNTER: Primary | ICD-10-CM

## 2018-07-13 PROCEDURE — 99024 POSTOP FOLLOW-UP VISIT: CPT | Performed by: ORTHOPAEDIC SURGERY

## 2018-07-13 PROCEDURE — 99212 OFFICE O/P EST SF 10 MIN: CPT | Performed by: ORTHOPAEDIC SURGERY

## 2018-07-13 NOTE — PROGRESS NOTES
This is a pleasant 28-year-old male that is 4 weeks status post left knee arthroscopy and partial medial meniscectomy and chondroplasty of the medial femoral condyle. He has had no fevers, chills, chest pain, shortness of breath.   The patient reports that

## 2018-11-19 ENCOUNTER — OFFICE VISIT (OUTPATIENT)
Dept: INTERNAL MEDICINE CLINIC | Facility: CLINIC | Age: 48
End: 2018-11-19
Payer: MEDICAID

## 2018-11-19 ENCOUNTER — HOSPITAL ENCOUNTER (OUTPATIENT)
Dept: GENERAL RADIOLOGY | Age: 48
Discharge: HOME | End: 2018-11-19
Attending: INTERNAL MEDICINE
Payer: MEDICAID

## 2018-11-19 VITALS
TEMPERATURE: 97 F | DIASTOLIC BLOOD PRESSURE: 75 MMHG | BODY MASS INDEX: 29.95 KG/M2 | HEART RATE: 66 BPM | HEIGHT: 73 IN | SYSTOLIC BLOOD PRESSURE: 121 MMHG | WEIGHT: 226 LBS

## 2018-11-19 DIAGNOSIS — M25.561 CHRONIC PAIN OF RIGHT KNEE: Primary | ICD-10-CM

## 2018-11-19 DIAGNOSIS — G89.29 CHRONIC PAIN OF RIGHT KNEE: ICD-10-CM

## 2018-11-19 DIAGNOSIS — M25.561 CHRONIC PAIN OF RIGHT KNEE: ICD-10-CM

## 2018-11-19 DIAGNOSIS — G89.29 CHRONIC PAIN OF RIGHT KNEE: Primary | ICD-10-CM

## 2018-11-19 PROCEDURE — 99213 OFFICE O/P EST LOW 20 MIN: CPT | Performed by: INTERNAL MEDICINE

## 2018-11-19 PROCEDURE — 99212 OFFICE O/P EST SF 10 MIN: CPT | Performed by: INTERNAL MEDICINE

## 2018-11-19 PROCEDURE — 73562 X-RAY EXAM OF KNEE 3: CPT | Performed by: INTERNAL MEDICINE

## 2018-11-30 ENCOUNTER — OFFICE VISIT (OUTPATIENT)
Dept: ORTHOPEDICS CLINIC | Facility: CLINIC | Age: 48
End: 2018-11-30
Payer: MEDICAID

## 2018-11-30 DIAGNOSIS — M17.11 PRIMARY OSTEOARTHRITIS OF RIGHT KNEE: Primary | ICD-10-CM

## 2018-11-30 PROCEDURE — 99214 OFFICE O/P EST MOD 30 MIN: CPT | Performed by: ORTHOPAEDIC SURGERY

## 2018-11-30 PROCEDURE — 99212 OFFICE O/P EST SF 10 MIN: CPT | Performed by: ORTHOPAEDIC SURGERY

## 2018-12-04 ENCOUNTER — OFFICE VISIT (OUTPATIENT)
Dept: PHYSICAL THERAPY | Age: 48
End: 2018-12-04
Attending: ORTHOPAEDIC SURGERY
Payer: MEDICAID

## 2018-12-04 DIAGNOSIS — M17.11 PRIMARY OSTEOARTHRITIS OF RIGHT KNEE: ICD-10-CM

## 2018-12-04 PROCEDURE — 97110 THERAPEUTIC EXERCISES: CPT | Performed by: PHYSICAL THERAPIST

## 2018-12-04 PROCEDURE — 97530 THERAPEUTIC ACTIVITIES: CPT | Performed by: PHYSICAL THERAPIST

## 2018-12-04 PROCEDURE — 97161 PT EVAL LOW COMPLEX 20 MIN: CPT | Performed by: PHYSICAL THERAPIST

## 2018-12-04 NOTE — PROGRESS NOTES
P.T. EVALUATION:   Referring Physician: Dr. Rohan Valdez  Diagnosis: Primary osteoarthritis of right knee (M17.11)    Date of Onset: 11/30/2018 Date of Service: 12/4/2018     PATIENT SUMMARY   Jeri Crooks is a 50year old y/o male who presents to therapy to gait deviations but with decreased tolerance to prolonged walking distance. Today’s Treatment and Response:  Patient education provided on PT eval findings, treatment plan, goals, HEP.   Patient received today:  PT Eval Low Complexity,   Therapeutic acti while under my care.       X___________________________________________________ Date____________________    Certification From: 54/9/1185  To:3/4/2019

## 2018-12-07 ENCOUNTER — OFFICE VISIT (OUTPATIENT)
Dept: PHYSICAL THERAPY | Age: 48
End: 2018-12-07
Attending: ORTHOPAEDIC SURGERY
Payer: MEDICAID

## 2018-12-07 DIAGNOSIS — M17.11 PRIMARY OSTEOARTHRITIS OF RIGHT KNEE: ICD-10-CM

## 2018-12-07 PROCEDURE — 97110 THERAPEUTIC EXERCISES: CPT | Performed by: PHYSICAL THERAPIST

## 2018-12-07 PROCEDURE — 97530 THERAPEUTIC ACTIVITIES: CPT | Performed by: PHYSICAL THERAPIST

## 2018-12-07 NOTE — PROGRESS NOTES
Diagnosis: Primary osteoarthritis of right knee (M17.11)          Next MD visit: none scheduled  Fall Risk: standard         Precautions: n/a          Medication Changes since last visit?: No    Subjective: States his knee is feeling good today.  Reports

## 2018-12-10 ENCOUNTER — OFFICE VISIT (OUTPATIENT)
Dept: PHYSICAL THERAPY | Age: 48
End: 2018-12-10
Attending: ORTHOPAEDIC SURGERY
Payer: MEDICAID

## 2018-12-10 DIAGNOSIS — M17.11 PRIMARY OSTEOARTHRITIS OF RIGHT KNEE: ICD-10-CM

## 2018-12-10 PROCEDURE — 97110 THERAPEUTIC EXERCISES: CPT

## 2018-12-10 NOTE — PROGRESS NOTES
Diagnosis: Primary osteoarthritis of right knee (M17.11)          Next MD visit: none scheduled  Fall Risk: standard         Precautions: n/a          Medication Changes since last visit?: No    Subjective: Patient reports no c/o R knee pain today.  Emilie

## 2018-12-14 ENCOUNTER — APPOINTMENT (OUTPATIENT)
Dept: PHYSICAL THERAPY | Age: 48
End: 2018-12-14
Attending: ORTHOPAEDIC SURGERY
Payer: MEDICAID

## 2018-12-17 ENCOUNTER — APPOINTMENT (OUTPATIENT)
Dept: PHYSICAL THERAPY | Age: 48
End: 2018-12-17
Attending: ORTHOPAEDIC SURGERY
Payer: MEDICAID

## 2018-12-19 ENCOUNTER — APPOINTMENT (OUTPATIENT)
Dept: PHYSICAL THERAPY | Age: 48
End: 2018-12-19
Attending: ORTHOPAEDIC SURGERY
Payer: MEDICAID

## 2019-01-02 ENCOUNTER — APPOINTMENT (OUTPATIENT)
Dept: PHYSICAL THERAPY | Age: 49
End: 2019-01-02
Attending: ORTHOPAEDIC SURGERY
Payer: MEDICAID

## 2019-01-04 ENCOUNTER — APPOINTMENT (OUTPATIENT)
Dept: PHYSICAL THERAPY | Age: 49
End: 2019-01-04
Attending: ORTHOPAEDIC SURGERY
Payer: MEDICAID

## 2020-04-03 NOTE — PROGRESS NOTES
HPI:    Patient ID: Shanna Ibarra is a 50year old male.     HPI    Right knee pain   Chronic   Would like to follow up with ortho    Left knee  Treated feeling better    /75 (BP Location: Right arm, Patient Position: Sitting, Cuff Size: adult)   Pul Pt new admit this shift. Pt reports SI, reports that she is tired of not having help. Reports poor appetite and good sleep. Pt has rested well during this shift. Pt preoccupied with Klonopin and Suboxone.   SKIN GRAFT Right 5/1/2017    Performed by Cherylene Moulder, MD at [facility] OR   • SKIN SURGERY  02/28/2018    L mid back and R lateral back epidermal inclusion cysts      Family History   Problem Relation Age of Onset   • Cancer Mother    • Stroke Neg    • H

## 2020-08-18 ENCOUNTER — HOSPITAL ENCOUNTER (OUTPATIENT)
Dept: GENERAL RADIOLOGY | Age: 50
Discharge: HOME | End: 2020-08-18
Attending: INTERNAL MEDICINE
Payer: MEDICAID

## 2020-08-18 ENCOUNTER — OFFICE VISIT (OUTPATIENT)
Dept: INTERNAL MEDICINE CLINIC | Facility: CLINIC | Age: 50
End: 2020-08-18
Payer: MEDICAID

## 2020-08-18 VITALS
WEIGHT: 244 LBS | BODY MASS INDEX: 32.34 KG/M2 | TEMPERATURE: 98 F | DIASTOLIC BLOOD PRESSURE: 84 MMHG | HEART RATE: 67 BPM | SYSTOLIC BLOOD PRESSURE: 141 MMHG | HEIGHT: 73 IN

## 2020-08-18 DIAGNOSIS — R07.89 CHEST WALL PAIN: ICD-10-CM

## 2020-08-18 DIAGNOSIS — R07.89 CHEST WALL PAIN: Primary | ICD-10-CM

## 2020-08-18 DIAGNOSIS — Z00.00 ROUTINE GENERAL MEDICAL EXAMINATION AT A HEALTH CARE FACILITY: ICD-10-CM

## 2020-08-18 PROCEDURE — 3079F DIAST BP 80-89 MM HG: CPT | Performed by: INTERNAL MEDICINE

## 2020-08-18 PROCEDURE — 3008F BODY MASS INDEX DOCD: CPT | Performed by: INTERNAL MEDICINE

## 2020-08-18 PROCEDURE — 3077F SYST BP >= 140 MM HG: CPT | Performed by: INTERNAL MEDICINE

## 2020-08-18 PROCEDURE — 71100 X-RAY EXAM RIBS UNI 2 VIEWS: CPT | Performed by: INTERNAL MEDICINE

## 2020-08-18 PROCEDURE — 71046 X-RAY EXAM CHEST 2 VIEWS: CPT | Performed by: INTERNAL MEDICINE

## 2020-08-18 PROCEDURE — 99214 OFFICE O/P EST MOD 30 MIN: CPT | Performed by: INTERNAL MEDICINE

## 2020-08-19 ENCOUNTER — APPOINTMENT (OUTPATIENT)
Dept: LAB | Age: 50
End: 2020-08-19
Attending: INTERNAL MEDICINE
Payer: MEDICAID

## 2020-08-19 DIAGNOSIS — Z00.00 ROUTINE GENERAL MEDICAL EXAMINATION AT A HEALTH CARE FACILITY: ICD-10-CM

## 2020-08-19 LAB
ALBUMIN SERPL-MCNC: 3.6 G/DL (ref 3.4–5)
ALBUMIN/GLOB SERPL: 0.9 {RATIO} (ref 1–2)
ALP LIVER SERPL-CCNC: 79 U/L (ref 45–117)
ALT SERPL-CCNC: 20 U/L (ref 16–61)
ANION GAP SERPL CALC-SCNC: 7 MMOL/L (ref 0–18)
AST SERPL-CCNC: 13 U/L (ref 15–37)
BACTERIA UR QL AUTO: NEGATIVE /HPF
BILIRUB SERPL-MCNC: 0.6 MG/DL (ref 0.1–2)
BUN BLD-MCNC: 14 MG/DL (ref 7–18)
BUN/CREAT SERPL: 14.6 (ref 10–20)
CALCIUM BLD-MCNC: 8.9 MG/DL (ref 8.5–10.1)
CHLORIDE SERPL-SCNC: 106 MMOL/L (ref 98–112)
CHOLEST SMN-MCNC: 240 MG/DL (ref ?–200)
CO2 SERPL-SCNC: 25 MMOL/L (ref 21–32)
CREAT BLD-MCNC: 0.96 MG/DL (ref 0.7–1.3)
DEPRECATED RDW RBC AUTO: 42.2 FL (ref 35.1–46.3)
ERYTHROCYTE [DISTWIDTH] IN BLOOD BY AUTOMATED COUNT: 13.1 % (ref 11–15)
EST. AVERAGE GLUCOSE BLD GHB EST-MCNC: 111 MG/DL (ref 68–126)
GLOBULIN PLAS-MCNC: 4.2 G/DL (ref 2.8–4.4)
GLUCOSE BLD-MCNC: 98 MG/DL (ref 70–99)
HBA1C MFR BLD HPLC: 5.5 % (ref ?–5.7)
HCT VFR BLD AUTO: 44.6 % (ref 39–53)
HDLC SERPL-MCNC: 35 MG/DL (ref 40–59)
HGB BLD-MCNC: 15.5 G/DL (ref 13–17.5)
LDLC SERPL CALC-MCNC: 149 MG/DL (ref ?–100)
M PROTEIN MFR SERPL ELPH: 7.8 G/DL (ref 6.4–8.2)
MCH RBC QN AUTO: 30.9 PG (ref 26–34)
MCHC RBC AUTO-ENTMCNC: 34.8 G/DL (ref 31–37)
MCV RBC AUTO: 89 FL (ref 80–100)
NONHDLC SERPL-MCNC: 205 MG/DL (ref ?–130)
OSMOLALITY SERPL CALC.SUM OF ELEC: 286 MOSM/KG (ref 275–295)
PATIENT FASTING Y/N/NP: YES
PATIENT FASTING Y/N/NP: YES
PLATELET # BLD AUTO: 222 10(3)UL (ref 150–450)
POTASSIUM SERPL-SCNC: 3.9 MMOL/L (ref 3.5–5.1)
RBC # BLD AUTO: 5.01 X10(6)UL (ref 4.3–5.7)
RBC #/AREA URNS AUTO: 1 /HPF
SODIUM SERPL-SCNC: 138 MMOL/L (ref 136–145)
T4 FREE SERPL-MCNC: 0.9 NG/DL (ref 0.8–1.7)
TRIGL SERPL-MCNC: 278 MG/DL (ref 30–149)
TSI SER-ACNC: 2.83 MIU/ML (ref 0.36–3.74)
VLDLC SERPL CALC-MCNC: 56 MG/DL (ref 0–30)
WBC # BLD AUTO: 6.3 X10(3) UL (ref 4–11)
WBC #/AREA URNS AUTO: <1 /HPF

## 2020-08-19 PROCEDURE — 80053 COMPREHEN METABOLIC PANEL: CPT

## 2020-08-19 PROCEDURE — 83036 HEMOGLOBIN GLYCOSYLATED A1C: CPT

## 2020-08-19 PROCEDURE — 84439 ASSAY OF FREE THYROXINE: CPT

## 2020-08-19 PROCEDURE — 85027 COMPLETE CBC AUTOMATED: CPT

## 2020-08-19 PROCEDURE — 36415 COLL VENOUS BLD VENIPUNCTURE: CPT

## 2020-08-19 PROCEDURE — 81015 MICROSCOPIC EXAM OF URINE: CPT

## 2020-08-19 PROCEDURE — 80061 LIPID PANEL: CPT

## 2020-08-19 PROCEDURE — 84443 ASSAY THYROID STIM HORMONE: CPT

## 2020-08-21 ENCOUNTER — TELEPHONE (OUTPATIENT)
Dept: INTERNAL MEDICINE CLINIC | Facility: CLINIC | Age: 50
End: 2020-08-21

## 2020-08-21 NOTE — TELEPHONE ENCOUNTER
Patient asking for clarification of instructions to take a daily aspirin for his complaints of dizziness. Asking if the tests or xrays results.     Patient advised that Dr. Jl Turk is out of office and he will likely hear about results and further instruct

## 2020-08-26 NOTE — TELEPHONE ENCOUNTER
Old rib fracure or deformity  Negative Xray and rib films  Nothing acute    I did not tell him to take  Aspirin for dizzines  His complaint was  Pain posterior back he thought he had an abscess or cellulitis  No evidence of either on exam    I advised him

## 2020-08-26 NOTE — PROGRESS NOTES
HPI:    Patient ID: Garrick Burger is a 48year old male.     HPI    posteriol back pain    Area where he had an abscess that was drained itn the past  Left side  Tender to touch  When he is working he has sharp pain   Pt asking for an antibiotic  Hx of tob mouth daily. 21 tablet 0   • HYDROcodone-acetaminophen (NORCO) 5-325 MG Oral Tab Take 1-2 tablets by mouth every 6 (six) hours as needed for Pain.  (Patient not taking: Reported on 8/18/2020 ) 10 tablet 0   • Meloxicam 15 MG Oral Tab Take 1 tablet (15 mg to Conjunctivae are normal. No scleral icterus. Cardiovascular: Normal rate, regular rhythm and normal heart sounds. Pulmonary/Chest: Breath sounds normal. No respiratory distress. He has no wheezes.    Posterior chest wall no evidence of abscess celluliti

## 2020-08-27 NOTE — TELEPHONE ENCOUNTER
Follow up to eval and treat dizziness  I do not recall discussing dizziness and aspirin last visit  If he is unsafe at work  And he feels dizzy     Dizziness is nonspecific dompalint could be due to many reasons  One is vertigo  He should be evaluated   Gi

## 2020-08-27 NOTE — TELEPHONE ENCOUNTER
Patient contacted (Name and  of pt verified). All results and recommendations reviewed. Patient verbalizes understanding, denies further questions and agrees with plan of care. Patient states Dr. Hafsa Dunn told him to take aspirin for dizziness.    Per

## 2020-08-28 NOTE — TELEPHONE ENCOUNTER
Spoke with patient regarding MD message below. Patient scheduled an appointment to see Dr. Risa Elliott, Wednesday 9/2/20 at 8:00 am at [address] [place] [address].

## 2020-08-28 NOTE — TELEPHONE ENCOUNTER
Cannot leave any messages=mobile  Number VM not set up yet and the home number just keep ringing and went to busy signal.  No MyChart. RN=call and follow up.

## 2020-09-02 ENCOUNTER — OFFICE VISIT (OUTPATIENT)
Dept: INTERNAL MEDICINE CLINIC | Facility: CLINIC | Age: 50
End: 2020-09-02
Payer: MEDICAID

## 2020-09-02 VITALS
DIASTOLIC BLOOD PRESSURE: 88 MMHG | WEIGHT: 240 LBS | HEIGHT: 73 IN | HEART RATE: 66 BPM | BODY MASS INDEX: 31.81 KG/M2 | SYSTOLIC BLOOD PRESSURE: 127 MMHG

## 2020-09-02 DIAGNOSIS — R09.81 NASAL CONGESTION: ICD-10-CM

## 2020-09-02 DIAGNOSIS — H69.80 DYSFUNCTION OF EUSTACHIAN TUBE, UNSPECIFIED LATERALITY: ICD-10-CM

## 2020-09-02 DIAGNOSIS — R42 VERTIGO: Primary | ICD-10-CM

## 2020-09-02 PROCEDURE — 99214 OFFICE O/P EST MOD 30 MIN: CPT | Performed by: INTERNAL MEDICINE

## 2020-09-02 PROCEDURE — 3079F DIAST BP 80-89 MM HG: CPT | Performed by: INTERNAL MEDICINE

## 2020-09-02 PROCEDURE — 3074F SYST BP LT 130 MM HG: CPT | Performed by: INTERNAL MEDICINE

## 2020-09-02 PROCEDURE — 3008F BODY MASS INDEX DOCD: CPT | Performed by: INTERNAL MEDICINE

## 2020-09-02 RX ORDER — FEXOFENADINE HCL 180 MG/1
180 TABLET ORAL DAILY
Qty: 90 TABLET | Refills: 0 | Status: SHIPPED | OUTPATIENT
Start: 2020-09-02

## 2020-09-02 RX ORDER — AZELASTINE HYDROCHLORIDE 137 UG/1
1 SPRAY, METERED NASAL 2 TIMES DAILY
Qty: 30 ML | Refills: 1 | Status: SHIPPED | OUTPATIENT
Start: 2020-09-02 | End: 2021-04-13 | Stop reason: ALTCHOICE

## 2020-09-02 RX ORDER — FLUTICASONE PROPIONATE 50 MCG
SPRAY, SUSPENSION (ML) NASAL
Qty: 1 BOTTLE | Refills: 3 | Status: SHIPPED | OUTPATIENT
Start: 2020-09-02 | End: 2021-04-13 | Stop reason: ALTCHOICE

## 2020-09-02 NOTE — PROGRESS NOTES
History of Present Illness   Patient ID: Shanna Ibarra is a 48year old male.   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 Conjunctivae and EOM are normal. Right eye exhibits no discharge. Left eye exhibits no discharge. Right eye exhibits normal extraocular motion and no nystagmus.  Left eye exhibits normal extraocular will Is Non- : No      Diabetic: No      Tobacco smoker: No      Systolic Blood Pressure: 901 mmHg      Is BP treated: No      HDL Cholesterol: 35 mg/dL      Total Cholesterol: 240 mg/dL    Medical History    Reviewed Active Problems • aspirin 325 MG Oral Tab Take 1 tablet (325 mg total) by mouth daily.  (Patient not taking: Reported on 9/2/2020 ) 21 tablet 0   • metRONIDAZOLE 0.75 % External Cream Use bid for rosacea (Patient not taking: Reported on 8/18/2020 ) 60 g 6            Assess Call office with any questions or seek emergency care if necessary. Patient understands and agrees to follow directions and advice.       ----------------------------------------- PATIENT INSTRUCTIONS-----------------------------------------     Patient I · If possible, sleep in a room with no carpet, curtains, or upholstered furniture. Cockroaches:  · Store food in sealed containers. · Remove garbage from the home promptly.   · Fix water leaks  Mold:  · Keep humidity low by using a dehumidifier or air con Normally, allergens are harmless. But when a person has allergies, the body thinks they are harmful. The body then attacks allergens with antibodies. Antibodies are attached to special cells called mast cells. Allergens stick to the antibodies.  This makes Nasal allergies are most commonly caused by one or more of 4 kinds of allergens: pollen (which causes seasonal allergies), house-dust mites, mold, and animals. Other substances, called irritants, can bother the nose and make allergy symptoms worse.   Pollen Dust mites are a common cause of nasal allergies. These mites are tiny organisms that live in bedding, upholstered furniture, and carpet. They live in warm, humid conditions. House-dust mites are almost impossible to get rid of.  But you can keep them under There are many OTC allergy medicines including antihistamines, decongestants, and steroid nasal spray. And, there are combination products. For example, a pill with both an antihistamine and a decongestant.  You may also be instructed to take more than one FLUTICASONE (floo TIK a sone) is a corticosteroid. This medicine is used to treat the symptoms of allergies like sneezing, itchy red eyes, and itchy, runny, or stuffy nose. This medicine is also used to treat nasal polyps. How should I use this medicine? · certain medicines for fungal infections like ketoconazole, itraconazole, and voriconazole  · conivaptan  · nefazodone  · some medicines for HIV  · vaccines  What if I miss a dose? If you miss a dose, use it as soon as you remember.  If it is almost time

## 2020-11-09 ENCOUNTER — NURSE TRIAGE (OUTPATIENT)
Dept: INTERNAL MEDICINE CLINIC | Facility: CLINIC | Age: 50
End: 2020-11-09

## 2020-11-09 NOTE — TELEPHONE ENCOUNTER
Action Requested: Summary for Provider     []  Critical Lab, Recommendations Needed  [] Need Additional Advice  []   FYI    []   Need Orders  [] Need Medications Sent to Pharmacy  []  Other     SUMMARY: Patient calling with severe right elbow pain for the

## 2020-11-10 ENCOUNTER — OFFICE VISIT (OUTPATIENT)
Dept: INTERNAL MEDICINE CLINIC | Facility: CLINIC | Age: 50
End: 2020-11-10
Payer: MEDICAID

## 2020-11-10 VITALS
HEIGHT: 73 IN | DIASTOLIC BLOOD PRESSURE: 69 MMHG | SYSTOLIC BLOOD PRESSURE: 113 MMHG | TEMPERATURE: 98 F | HEART RATE: 76 BPM | BODY MASS INDEX: 31.23 KG/M2 | WEIGHT: 235.63 LBS

## 2020-11-10 DIAGNOSIS — M10.9 ACUTE GOUT OF LEFT ELBOW, UNSPECIFIED CAUSE: ICD-10-CM

## 2020-11-10 DIAGNOSIS — M25.522 LEFT ELBOW PAIN: Primary | ICD-10-CM

## 2020-11-10 PROCEDURE — 3074F SYST BP LT 130 MM HG: CPT | Performed by: INTERNAL MEDICINE

## 2020-11-10 PROCEDURE — 3008F BODY MASS INDEX DOCD: CPT | Performed by: INTERNAL MEDICINE

## 2020-11-10 PROCEDURE — 3078F DIAST BP <80 MM HG: CPT | Performed by: INTERNAL MEDICINE

## 2020-11-10 PROCEDURE — 99214 OFFICE O/P EST MOD 30 MIN: CPT | Performed by: INTERNAL MEDICINE

## 2020-11-10 RX ORDER — METHYLPREDNISOLONE 4 MG/1
TABLET ORAL
Qty: 1 KIT | Refills: 0 | Status: SHIPPED | OUTPATIENT
Start: 2020-11-10 | End: 2021-04-13 | Stop reason: ALTCHOICE

## 2020-11-10 NOTE — PATIENT INSTRUCTIONS
Treating Gout Attacks     Raising the joint above the level of your heart can help reduce gout symptoms. Gout is a disease that affects the joints. It is caused by excess uric acid in your blood that may lead to crystals forming in your joints.  Left ? Shellfish (lobster, crab, shrimp, scallop, mussel)  ? Certain fish (anchovy, sardine, herring, mackerel)  · Take any medicines prescribed by your healthcare provider. · Lose weight if you need to. · Reduce high fructose corn syrup in meals and drinks. To help prevent a gout attack, avoid these foods:  · Alcohol (particularly beer, but also red wine and spirits)  · Certain meats (red meat, processed meat, turkey)  · Organ meats (kidney, liver, sweetbread)  · Shellfish (lobster, crab, shrimp, scallop, mus Eating too many foods containing purines may raise the levels of uric acid in your body. This raises your risk for a gout attack. Try to limit these foods and drinks:  · Alcohol, such as beer and red wine. You may be told to avoid alcohol completely.   · So Neha last reviewed this educational content on 6/1/2018  © 1483-0996 [employer]. [address] [facility], [facility] [place]. All rights reserved. This information is not intended as a substitute for professional medical care.  Always follo · Apply an ice pack (ice cubes in a plastic bag wrapped in a thin towel) over the injured area for 20 minutes every 1 to 2 hours the first day for pain relief. Continue this 3 to 4 times a day for swelling and pain.   · Avoid alcohol and foods listed below Neha last reviewed this educational content on 6/1/2019  © 8906-1014 [employer]. [address] [facility], [facility] [place]. All rights reserved. This information is not intended as a substitute for professional medical care.  Always follo · Rest the painful joint and protect it from movement. This will allow the inflammation to heal faster. · Apply an ice pack over the injured area for no more than 15 to 20 minutes. Do this every 3 to 6 hours for the first 24 to 48 hours.  Keep using ice pa

## 2021-04-13 ENCOUNTER — HOSPITAL ENCOUNTER (OUTPATIENT)
Dept: GENERAL RADIOLOGY | Age: 51
Discharge: HOME | End: 2021-04-13
Attending: INTERNAL MEDICINE
Payer: MEDICAID

## 2021-04-13 ENCOUNTER — TELEPHONE (OUTPATIENT)
Dept: INTERNAL MEDICINE CLINIC | Facility: CLINIC | Age: 51
End: 2021-04-13

## 2021-04-13 ENCOUNTER — EKG ENCOUNTER (OUTPATIENT)
Dept: LAB | Age: 51
End: 2021-04-13
Attending: INTERNAL MEDICINE
Payer: MEDICAID

## 2021-04-13 ENCOUNTER — OFFICE VISIT (OUTPATIENT)
Dept: INTERNAL MEDICINE CLINIC | Facility: CLINIC | Age: 51
End: 2021-04-13
Payer: MEDICAID

## 2021-04-13 VITALS
WEIGHT: 235 LBS | HEIGHT: 73 IN | HEART RATE: 62 BPM | BODY MASS INDEX: 31.14 KG/M2 | SYSTOLIC BLOOD PRESSURE: 128 MMHG | TEMPERATURE: 97 F | DIASTOLIC BLOOD PRESSURE: 73 MMHG

## 2021-04-13 DIAGNOSIS — M25.522 PAIN OF BOTH ELBOWS: ICD-10-CM

## 2021-04-13 DIAGNOSIS — M25.521 PAIN OF BOTH ELBOWS: ICD-10-CM

## 2021-04-13 DIAGNOSIS — R07.9 CHEST PAIN, UNSPECIFIED TYPE: ICD-10-CM

## 2021-04-13 DIAGNOSIS — Z13.6 SCREENING, ISCHEMIC HEART DISEASE: ICD-10-CM

## 2021-04-13 DIAGNOSIS — M25.521 PAIN OF BOTH ELBOWS: Primary | ICD-10-CM

## 2021-04-13 DIAGNOSIS — M25.522 PAIN OF BOTH ELBOWS: Primary | ICD-10-CM

## 2021-04-13 PROCEDURE — 93005 ELECTROCARDIOGRAM TRACING: CPT

## 2021-04-13 PROCEDURE — 73080 X-RAY EXAM OF ELBOW: CPT | Performed by: INTERNAL MEDICINE

## 2021-04-13 PROCEDURE — 3074F SYST BP LT 130 MM HG: CPT | Performed by: INTERNAL MEDICINE

## 2021-04-13 PROCEDURE — 3078F DIAST BP <80 MM HG: CPT | Performed by: INTERNAL MEDICINE

## 2021-04-13 PROCEDURE — 99214 OFFICE O/P EST MOD 30 MIN: CPT | Performed by: INTERNAL MEDICINE

## 2021-04-13 PROCEDURE — 93010 ELECTROCARDIOGRAM REPORT: CPT | Performed by: INTERNAL MEDICINE

## 2021-04-13 PROCEDURE — 3008F BODY MASS INDEX DOCD: CPT | Performed by: INTERNAL MEDICINE

## 2021-04-13 RX ORDER — HYDROCODONE BITARTRATE AND ACETAMINOPHEN 5; 325 MG/1; MG/1
1-2 TABLET ORAL EVERY 12 HOURS PRN
Qty: 10 TABLET | Refills: 0 | Status: SHIPPED | OUTPATIENT
Start: 2021-04-13

## 2021-04-13 NOTE — TELEPHONE ENCOUNTER
Patient states pharmacy is requesting a PA. Current Outpatient Medications   Medication Sig Dispense Refill   • Diclofenac Sodium 1 % External Gel Apply 2 g topically 4 (four) times daily. Apply thin layer to affected joint.  100 g 3

## 2021-04-13 NOTE — TELEPHONE ENCOUNTER
I am fine with norco in limited amounts, if he needs more he can request. The government has placed strict rules on opiate prescriptions.

## 2021-04-13 NOTE — TELEPHONE ENCOUNTER
Patient states his script is incorrect. Patient would like a new script for 30 tablets and  MG. Please advice.     Current Outpatient Medications   Medication Sig Dispense Refill   • HYDROcodone-acetaminophen (NORCO) 5-325 MG Oral Tab Take 1-2 tabl

## 2021-04-13 NOTE — TELEPHONE ENCOUNTER
please see pt message below and he stated that he will like the 10/325mg and #30 tablets. This was his old prescription he said you were ok with this. This will last him 1 year.

## 2021-04-13 NOTE — TELEPHONE ENCOUNTER
Spoke to patient and informed him that if he needs a refill on hydrocodone that he can call the office for a refill. He thought he needed an appointment for a refill that's why he was requesting more.  But he's fine with calling if he needs a refill

## 2021-04-13 NOTE — TELEPHONE ENCOUNTER
Spoke to patient and informed him that this is available otc and the pharmacy can help him with locating it

## 2021-04-19 ENCOUNTER — TELEPHONE (OUTPATIENT)
Dept: INTERNAL MEDICINE CLINIC | Facility: CLINIC | Age: 51
End: 2021-04-19

## 2021-04-19 NOTE — TELEPHONE ENCOUNTER
Current Outpatient Medications:     •  Diclofenac Sodium 1 % External Gel, Apply 2 g topically 4 (four) times daily. Apply thin layer to affected joint. , Disp: 100 g, Rfl: 3

## 2021-04-22 ENCOUNTER — TELEPHONE (OUTPATIENT)
Dept: INTERNAL MEDICINE CLINIC | Facility: CLINIC | Age: 51
End: 2021-04-22

## 2021-04-22 NOTE — TELEPHONE ENCOUNTER
Current Outpatient Medications:   •  Diclofenac Sodium 1 % External Gel, Apply 2 g topically 4 (four) times daily. Apply thin layer to affected joint. , Disp: 100 g, Rfl: 3      Message: Plan does not cover this medication .  Please call plan at [phone]

## 2021-05-05 ENCOUNTER — HOSPITAL ENCOUNTER (OUTPATIENT)
Dept: CT IMAGING | Facility: HOSPITAL | Age: 51
Discharge: HOME | End: 2021-05-05
Attending: INTERNAL MEDICINE

## 2021-05-05 DIAGNOSIS — Z13.6 SCREENING, ISCHEMIC HEART DISEASE: ICD-10-CM

## 2021-06-17 ENCOUNTER — TELEPHONE (OUTPATIENT)
Dept: OTHER | Facility: HOSPITAL | Age: 51
End: 2021-06-17

## 2021-06-17 ENCOUNTER — HOSPITAL ENCOUNTER (OUTPATIENT)
Dept: ULTRASOUND IMAGING | Facility: HOSPITAL | Age: 51
Discharge: HOME | End: 2021-06-17
Attending: INTERNAL MEDICINE

## 2021-06-17 DIAGNOSIS — Z13.9 ENCOUNTER FOR SCREENING: ICD-10-CM

## 2021-06-17 NOTE — PROGRESS NOTES
PV screening complete today called with results which were normal. Pt does not have my chart set up on his phone.  He has not followed up with MD after heart scan but states he is aware of the recommendation for cholesterol medication but does not want to g

## 2022-02-19 NOTE — LETTER
11/10/2020          To Whom It May Concern:    Johana Mondragon is currently under my medical care and may not return to work at this time. Please excuse Fabian Davis for 5 days. He may return to work on 11/16/2020.     If you require additional information pl ---

## 2022-02-23 ENCOUNTER — HOSPITAL ENCOUNTER (OUTPATIENT)
Dept: GENERAL RADIOLOGY | Age: 52
Discharge: HOME | End: 2022-02-23
Attending: INTERNAL MEDICINE
Payer: MEDICAID

## 2022-02-23 ENCOUNTER — OFFICE VISIT (OUTPATIENT)
Dept: INTERNAL MEDICINE CLINIC | Facility: CLINIC | Age: 52
End: 2022-02-23
Payer: MEDICAID

## 2022-02-23 VITALS
SYSTOLIC BLOOD PRESSURE: 112 MMHG | HEIGHT: 73 IN | DIASTOLIC BLOOD PRESSURE: 64 MMHG | BODY MASS INDEX: 31.54 KG/M2 | HEART RATE: 63 BPM | WEIGHT: 238 LBS

## 2022-02-23 DIAGNOSIS — M70.51 BURSITIS OF OTHER BURSA OF RIGHT KNEE: ICD-10-CM

## 2022-02-23 DIAGNOSIS — M70.51 BURSITIS OF OTHER BURSA OF RIGHT KNEE: Primary | ICD-10-CM

## 2022-02-23 PROCEDURE — 3074F SYST BP LT 130 MM HG: CPT | Performed by: INTERNAL MEDICINE

## 2022-02-23 PROCEDURE — 3008F BODY MASS INDEX DOCD: CPT | Performed by: INTERNAL MEDICINE

## 2022-02-23 PROCEDURE — 99214 OFFICE O/P EST MOD 30 MIN: CPT | Performed by: INTERNAL MEDICINE

## 2022-02-23 PROCEDURE — 73560 X-RAY EXAM OF KNEE 1 OR 2: CPT | Performed by: INTERNAL MEDICINE

## 2022-02-23 PROCEDURE — 3078F DIAST BP <80 MM HG: CPT | Performed by: INTERNAL MEDICINE

## 2022-02-23 RX ORDER — HYDROCODONE BITARTRATE AND ACETAMINOPHEN 5; 325 MG/1; MG/1
1-2 TABLET ORAL EVERY 12 HOURS PRN
Qty: 14 TABLET | Refills: 0 | Status: SHIPPED | OUTPATIENT
Start: 2022-02-23

## 2022-02-23 RX ORDER — PREDNISONE 10 MG/1
TABLET ORAL
Qty: 15 TABLET | Refills: 0 | Status: SHIPPED | OUTPATIENT
Start: 2022-02-23 | End: 2022-03-02

## 2022-02-23 NOTE — PATIENT INSTRUCTIONS
Please purchase a hinged knee brace that you can wear at work. I would recommend you wear this as much as possible but at home and when you go to sleep you can take it off. Do not wear it to bed. You can use topical diclofenac gel in the back pain I will, this is also known as Voltaren, 50% of people have improvement with this medication the other 50% do not.    the smallest bottle, it is typically 5-10 bucks

## 2022-08-08 ENCOUNTER — OFFICE VISIT (OUTPATIENT)
Dept: INTERNAL MEDICINE CLINIC | Facility: CLINIC | Age: 52
End: 2022-08-08
Payer: MEDICAID

## 2022-08-08 ENCOUNTER — HOSPITAL ENCOUNTER (OUTPATIENT)
Dept: GENERAL RADIOLOGY | Age: 52
Discharge: HOME | End: 2022-08-08
Attending: INTERNAL MEDICINE
Payer: MEDICAID

## 2022-08-08 VITALS
BODY MASS INDEX: 31.54 KG/M2 | WEIGHT: 238 LBS | DIASTOLIC BLOOD PRESSURE: 82 MMHG | HEART RATE: 80 BPM | SYSTOLIC BLOOD PRESSURE: 136 MMHG | HEIGHT: 73 IN

## 2022-08-08 DIAGNOSIS — M70.51 BURSITIS OF OTHER BURSA OF RIGHT KNEE: ICD-10-CM

## 2022-08-08 DIAGNOSIS — M25.462 EFFUSION OF BURSA OF LEFT KNEE: ICD-10-CM

## 2022-08-08 DIAGNOSIS — Q18.1 CYST ON EAR: ICD-10-CM

## 2022-08-08 DIAGNOSIS — M17.12 PRIMARY OSTEOARTHRITIS OF LEFT KNEE: Primary | ICD-10-CM

## 2022-08-08 DIAGNOSIS — M17.12 PRIMARY OSTEOARTHRITIS OF LEFT KNEE: ICD-10-CM

## 2022-08-08 PROCEDURE — 3008F BODY MASS INDEX DOCD: CPT | Performed by: INTERNAL MEDICINE

## 2022-08-08 PROCEDURE — 3075F SYST BP GE 130 - 139MM HG: CPT | Performed by: INTERNAL MEDICINE

## 2022-08-08 PROCEDURE — 3079F DIAST BP 80-89 MM HG: CPT | Performed by: INTERNAL MEDICINE

## 2022-08-08 PROCEDURE — 73560 X-RAY EXAM OF KNEE 1 OR 2: CPT | Performed by: INTERNAL MEDICINE

## 2022-08-08 PROCEDURE — 99214 OFFICE O/P EST MOD 30 MIN: CPT | Performed by: INTERNAL MEDICINE

## 2022-08-08 RX ORDER — PREDNISONE 10 MG/1
TABLET ORAL
Qty: 18 TABLET | Refills: 0 | Status: SHIPPED | OUTPATIENT
Start: 2022-08-08 | End: 2022-08-17

## 2022-08-08 RX ORDER — HYDROCODONE BITARTRATE AND ACETAMINOPHEN 5; 325 MG/1; MG/1
1 TABLET ORAL EVERY 12 HOURS PRN
Qty: 14 TABLET | Refills: 0 | Status: SHIPPED | OUTPATIENT
Start: 2022-08-08

## 2022-08-19 ENCOUNTER — OFFICE VISIT (OUTPATIENT)
Dept: OTOLARYNGOLOGY | Facility: CLINIC | Age: 52
End: 2022-08-19
Payer: MEDICAID

## 2022-08-19 VITALS — BODY MASS INDEX: 31.54 KG/M2 | WEIGHT: 238 LBS | TEMPERATURE: 99 F | HEIGHT: 73 IN

## 2022-08-19 DIAGNOSIS — L72.0 EPIDERMOID CYST OF SKIN OF POSTAURICULAR REGION: Primary | ICD-10-CM

## 2022-08-19 PROCEDURE — 99203 OFFICE O/P NEW LOW 30 MIN: CPT | Performed by: OTOLARYNGOLOGY

## 2022-08-19 PROCEDURE — 3008F BODY MASS INDEX DOCD: CPT | Performed by: OTOLARYNGOLOGY

## 2022-08-22 ENCOUNTER — TELEPHONE (OUTPATIENT)
Dept: OTOLARYNGOLOGY | Facility: CLINIC | Age: 52
End: 2022-08-22

## 2022-08-22 NOTE — TELEPHONE ENCOUNTER
Advised pt that due to insurance, pt surgery will need to be rescheduled to 09/19/22 at [address] [place] [address].

## 2022-08-30 ENCOUNTER — NURSE TRIAGE (OUTPATIENT)
Dept: INTERNAL MEDICINE CLINIC | Facility: CLINIC | Age: 52
End: 2022-08-30

## 2022-08-31 ENCOUNTER — HOSPITAL ENCOUNTER (OUTPATIENT)
Age: 52
Discharge: HOME | End: 2022-08-31
Payer: MEDICAID

## 2022-08-31 ENCOUNTER — APPOINTMENT (OUTPATIENT)
Dept: GENERAL RADIOLOGY | Age: 52
End: 2022-08-31
Attending: NURSE PRACTITIONER
Payer: MEDICAID

## 2022-08-31 VITALS
WEIGHT: 238 LBS | OXYGEN SATURATION: 97 % | TEMPERATURE: 98 F | BODY MASS INDEX: 31.54 KG/M2 | RESPIRATION RATE: 18 BRPM | HEART RATE: 68 BPM | SYSTOLIC BLOOD PRESSURE: 132 MMHG | HEIGHT: 73 IN | DIASTOLIC BLOOD PRESSURE: 80 MMHG

## 2022-08-31 DIAGNOSIS — R07.89 CHEST WALL PAIN: ICD-10-CM

## 2022-08-31 DIAGNOSIS — M54.6 ACUTE LEFT-SIDED THORACIC BACK PAIN: Primary | ICD-10-CM

## 2022-08-31 LAB
#MXD IC: 0.4 X10ˆ3/UL (ref 0.1–1)
BUN BLD-MCNC: 17 MG/DL (ref 7–18)
CHLORIDE BLD-SCNC: 102 MMOL/L (ref 98–112)
CO2 BLD-SCNC: 28 MMOL/L (ref 21–32)
CREAT BLD-MCNC: 0.9 MG/DL
DDIMER WHOLE BLOOD: <200 NG/ML DDU (ref ?–400)
GFR SERPLBLD BASED ON 1.73 SQ M-ARVRAT: 103 ML/MIN/1.73M2 (ref 60–?)
GLUCOSE BLD-MCNC: 97 MG/DL (ref 70–99)
HCT VFR BLD AUTO: 46.1 %
HCT VFR BLD CALC: 49 %
HGB BLD-MCNC: 15.5 G/DL
ISTAT IONIZED CALCIUM FOR CHEM 8: 1.2 MMOL/L (ref 1.12–1.32)
LYMPHOCYTES # BLD AUTO: 1.9 X10ˆ3/UL (ref 1–4)
LYMPHOCYTES NFR BLD AUTO: 34.5 %
MCH RBC QN AUTO: 30.6 PG (ref 26–34)
MCHC RBC AUTO-ENTMCNC: 33.6 G/DL (ref 31–37)
MCV RBC AUTO: 90.9 FL (ref 80–100)
MIXED CELL %: 6.7 %
NEUTROPHILS # BLD AUTO: 3.3 X10ˆ3/UL (ref 1.5–7.7)
NEUTROPHILS NFR BLD AUTO: 58.8 %
PLATELET # BLD AUTO: 225 X10ˆ3/UL (ref 150–450)
POTASSIUM BLD-SCNC: 4.7 MMOL/L (ref 3.6–5.1)
RBC # BLD AUTO: 5.07 X10ˆ6/UL
SODIUM BLD-SCNC: 139 MMOL/L (ref 136–145)
TROPONIN I BLD-MCNC: <0.02 NG/ML
WBC # BLD AUTO: 5.6 X10ˆ3/UL (ref 4–11)

## 2022-08-31 PROCEDURE — 72072 X-RAY EXAM THORAC SPINE 3VWS: CPT | Performed by: NURSE PRACTITIONER

## 2022-08-31 PROCEDURE — 84484 ASSAY OF TROPONIN QUANT: CPT | Performed by: NURSE PRACTITIONER

## 2022-08-31 PROCEDURE — 85025 COMPLETE CBC W/AUTO DIFF WBC: CPT | Performed by: NURSE PRACTITIONER

## 2022-08-31 PROCEDURE — 99204 OFFICE O/P NEW MOD 45 MIN: CPT | Performed by: NURSE PRACTITIONER

## 2022-08-31 PROCEDURE — 93000 ELECTROCARDIOGRAM COMPLETE: CPT | Performed by: NURSE PRACTITIONER

## 2022-08-31 PROCEDURE — 80047 BASIC METABLC PNL IONIZED CA: CPT | Performed by: NURSE PRACTITIONER

## 2022-08-31 RX ORDER — CYCLOBENZAPRINE HCL 10 MG
10 TABLET ORAL 3 TIMES DAILY PRN
Qty: 20 TABLET | Refills: 0 | Status: SHIPPED | OUTPATIENT
Start: 2022-08-31

## 2022-08-31 NOTE — ED INITIAL ASSESSMENT (HPI)
Pt presents to the IC with c/o chest discomfort and back pain beginning 2 days ago. Had a cyst removed on his back 1 year ago, feels like it may be coming back.

## 2022-09-09 ENCOUNTER — OFFICE VISIT (OUTPATIENT)
Dept: INTERNAL MEDICINE CLINIC | Facility: CLINIC | Age: 52
End: 2022-09-09
Payer: MEDICAID

## 2022-09-09 ENCOUNTER — LAB ENCOUNTER (OUTPATIENT)
Dept: LAB | Age: 52
End: 2022-09-09
Attending: INTERNAL MEDICINE
Payer: MEDICAID

## 2022-09-09 VITALS
DIASTOLIC BLOOD PRESSURE: 73 MMHG | WEIGHT: 246.63 LBS | HEIGHT: 73 IN | BODY MASS INDEX: 32.69 KG/M2 | SYSTOLIC BLOOD PRESSURE: 118 MMHG | HEART RATE: 74 BPM

## 2022-09-09 DIAGNOSIS — Z00.00 ANNUAL PHYSICAL EXAM: Primary | ICD-10-CM

## 2022-09-09 DIAGNOSIS — J44.9 CHRONIC OBSTRUCTIVE PULMONARY DISEASE, UNSPECIFIED COPD TYPE (HCC): ICD-10-CM

## 2022-09-09 DIAGNOSIS — E55.9 VITAMIN D DEFICIENCY: ICD-10-CM

## 2022-09-09 DIAGNOSIS — Z00.00 ANNUAL PHYSICAL EXAM: ICD-10-CM

## 2022-09-09 DIAGNOSIS — Z12.5 ENCOUNTER FOR SCREENING FOR MALIGNANT NEOPLASM OF PROSTATE: ICD-10-CM

## 2022-09-09 DIAGNOSIS — R93.1 ELEVATED CORONARY ARTERY CALCIUM SCORE: ICD-10-CM

## 2022-09-09 DIAGNOSIS — E78.2 MIXED HYPERLIPIDEMIA: ICD-10-CM

## 2022-09-09 DIAGNOSIS — Z12.11 COLON CANCER SCREENING: ICD-10-CM

## 2022-09-09 PROBLEM — M25.521 PAIN OF BOTH ELBOWS: Status: RESOLVED | Noted: 2021-04-13 | Resolved: 2022-09-09

## 2022-09-09 PROBLEM — M25.522 PAIN OF BOTH ELBOWS: Status: RESOLVED | Noted: 2021-04-13 | Resolved: 2022-09-09

## 2022-09-09 LAB
ALBUMIN SERPL-MCNC: 3.7 G/DL (ref 3.4–5)
ALBUMIN/GLOB SERPL: 0.9 {RATIO} (ref 1–2)
ALP LIVER SERPL-CCNC: 85 U/L
ALT SERPL-CCNC: 23 U/L
ANION GAP SERPL CALC-SCNC: 9 MMOL/L (ref 0–18)
AST SERPL-CCNC: 15 U/L (ref 15–37)
BILIRUB SERPL-MCNC: 0.6 MG/DL (ref 0.1–2)
BUN BLD-MCNC: 16 MG/DL (ref 7–18)
BUN/CREAT SERPL: 15.5 (ref 10–20)
CALCIUM BLD-MCNC: 9 MG/DL (ref 8.5–10.1)
CHLORIDE SERPL-SCNC: 107 MMOL/L (ref 98–112)
CHOLEST SERPL-MCNC: 222 MG/DL (ref ?–200)
CO2 SERPL-SCNC: 26 MMOL/L (ref 21–32)
COMPLEXED PSA SERPL-MCNC: 2.53 NG/ML (ref ?–4)
CREAT BLD-MCNC: 1.03 MG/DL
DEPRECATED RDW RBC AUTO: 45.8 FL (ref 35.1–46.3)
ERYTHROCYTE [DISTWIDTH] IN BLOOD BY AUTOMATED COUNT: 13 % (ref 11–15)
EST. AVERAGE GLUCOSE BLD GHB EST-MCNC: 117 MG/DL (ref 68–126)
FASTING PATIENT LIPID ANSWER: NO
FASTING STATUS PATIENT QL REPORTED: NO
GFR SERPLBLD BASED ON 1.73 SQ M-ARVRAT: 87 ML/MIN/1.73M2 (ref 60–?)
GLOBULIN PLAS-MCNC: 4 G/DL (ref 2.8–4.4)
GLUCOSE BLD-MCNC: 128 MG/DL (ref 70–99)
HBA1C MFR BLD: 5.7 % (ref ?–5.7)
HCT VFR BLD AUTO: 47 %
HDLC SERPL-MCNC: 36 MG/DL (ref 40–59)
HGB BLD-MCNC: 15.3 G/DL
LDLC SERPL CALC-MCNC: 127 MG/DL (ref ?–100)
MCH RBC QN AUTO: 31.2 PG (ref 26–34)
MCHC RBC AUTO-ENTMCNC: 32.6 G/DL (ref 31–37)
MCV RBC AUTO: 95.7 FL
NONHDLC SERPL-MCNC: 186 MG/DL (ref ?–130)
OSMOLALITY SERPL CALC.SUM OF ELEC: 297 MOSM/KG (ref 275–295)
PLATELET # BLD AUTO: 264 10(3)UL (ref 150–450)
POTASSIUM SERPL-SCNC: 4.8 MMOL/L (ref 3.5–5.1)
PROT SERPL-MCNC: 7.7 G/DL (ref 6.4–8.2)
RBC # BLD AUTO: 4.91 X10(6)UL
SODIUM SERPL-SCNC: 142 MMOL/L (ref 136–145)
TRIGL SERPL-MCNC: 330 MG/DL (ref 30–149)
TSI SER-ACNC: 1.41 MIU/ML (ref 0.36–3.74)
VIT D+METAB SERPL-MCNC: 24.4 NG/ML (ref 30–100)
VLDLC SERPL CALC-MCNC: 60 MG/DL (ref 0–30)
WBC # BLD AUTO: 6 X10(3) UL (ref 4–11)

## 2022-09-09 PROCEDURE — 99396 PREV VISIT EST AGE 40-64: CPT | Performed by: INTERNAL MEDICINE

## 2022-09-09 PROCEDURE — 3008F BODY MASS INDEX DOCD: CPT | Performed by: INTERNAL MEDICINE

## 2022-09-09 PROCEDURE — 80061 LIPID PANEL: CPT

## 2022-09-09 PROCEDURE — 80053 COMPREHEN METABOLIC PANEL: CPT

## 2022-09-09 PROCEDURE — 85027 COMPLETE CBC AUTOMATED: CPT

## 2022-09-09 PROCEDURE — 84443 ASSAY THYROID STIM HORMONE: CPT

## 2022-09-09 PROCEDURE — 3078F DIAST BP <80 MM HG: CPT | Performed by: INTERNAL MEDICINE

## 2022-09-09 PROCEDURE — 82306 VITAMIN D 25 HYDROXY: CPT

## 2022-09-09 PROCEDURE — 36415 COLL VENOUS BLD VENIPUNCTURE: CPT

## 2022-09-09 PROCEDURE — 3074F SYST BP LT 130 MM HG: CPT | Performed by: INTERNAL MEDICINE

## 2022-09-09 PROCEDURE — 83036 HEMOGLOBIN GLYCOSYLATED A1C: CPT

## 2022-09-13 ENCOUNTER — OFFICE VISIT (OUTPATIENT)
Dept: ORTHOPEDICS CLINIC | Facility: CLINIC | Age: 52
End: 2022-09-13
Payer: MEDICAID

## 2022-09-13 VITALS — HEIGHT: 72 IN | WEIGHT: 234 LBS | BODY MASS INDEX: 31.69 KG/M2

## 2022-09-13 DIAGNOSIS — E66.9 OBESITY (BMI 30.0-34.9): Primary | ICD-10-CM

## 2022-09-13 DIAGNOSIS — M23.204 DEGENERATIVE TEAR OF LEFT MEDIAL MENISCUS: ICD-10-CM

## 2022-09-13 DIAGNOSIS — M17.12 PRIMARY OSTEOARTHRITIS OF LEFT KNEE: ICD-10-CM

## 2022-09-13 PROCEDURE — 3008F BODY MASS INDEX DOCD: CPT | Performed by: ORTHOPAEDIC SURGERY

## 2022-09-13 PROCEDURE — 99244 OFF/OP CNSLTJ NEW/EST MOD 40: CPT | Performed by: ORTHOPAEDIC SURGERY

## 2022-09-16 ENCOUNTER — LAB ENCOUNTER (OUTPATIENT)
Dept: LAB | Age: 52
End: 2022-09-16
Attending: OTOLARYNGOLOGY

## 2022-09-16 DIAGNOSIS — Z01.818 PREOP TESTING: ICD-10-CM

## 2022-09-16 LAB — SARS-COV-2 RNA RESP QL NAA+PROBE: NOT DETECTED

## 2022-09-19 ENCOUNTER — HOSPITAL ENCOUNTER (OUTPATIENT)
Facility: HOSPITAL | Age: 52
Setting detail: HOSPITAL OUTPATIENT SURGERY
Discharge: HOME | End: 2022-09-19
Attending: OTOLARYNGOLOGY | Admitting: OTOLARYNGOLOGY

## 2022-09-19 VITALS
TEMPERATURE: 98 F | DIASTOLIC BLOOD PRESSURE: 77 MMHG | SYSTOLIC BLOOD PRESSURE: 142 MMHG | HEIGHT: 73 IN | WEIGHT: 237 LBS | RESPIRATION RATE: 18 BRPM | OXYGEN SATURATION: 93 % | HEART RATE: 68 BPM | BODY MASS INDEX: 31.41 KG/M2

## 2022-09-19 DIAGNOSIS — Z01.818 PREOP TESTING: Primary | ICD-10-CM

## 2022-09-19 DIAGNOSIS — L72.0 EPIDERMOID CYST OF SKIN OF POSTAURICULAR REGION: ICD-10-CM

## 2022-09-19 PROCEDURE — 0JB00ZZ EXCISION OF SCALP SUBCUTANEOUS TISSUE AND FASCIA, OPEN APPROACH: ICD-10-PCS | Performed by: OTOLARYNGOLOGY

## 2022-09-19 PROCEDURE — 0HB0XZZ EXCISION OF SCALP SKIN, EXTERNAL APPROACH: ICD-10-PCS | Performed by: OTOLARYNGOLOGY

## 2022-09-19 PROCEDURE — 11446 EXC FACE-MM B9+MARG >4 CM: CPT | Performed by: OTOLARYNGOLOGY

## 2022-09-19 PROCEDURE — 13152 CMPLX RPR E/N/E/L 2.6-7.5 CM: CPT | Performed by: OTOLARYNGOLOGY

## 2022-09-19 RX ORDER — HYDROMORPHONE HYDROCHLORIDE 1 MG/ML
0.8 INJECTION, SOLUTION INTRAMUSCULAR; INTRAVENOUS; SUBCUTANEOUS EVERY 2 HOUR PRN
OUTPATIENT
Start: 2022-09-19

## 2022-09-19 RX ORDER — CEPHALEXIN 500 MG/1
500 CAPSULE ORAL EVERY 8 HOURS
Qty: 21 CAPSULE | Refills: 0 | Status: SHIPPED | OUTPATIENT
Start: 2022-09-19

## 2022-09-19 RX ORDER — LIDOCAINE HYDROCHLORIDE AND EPINEPHRINE 10; 10 MG/ML; UG/ML
INJECTION, SOLUTION INFILTRATION; PERINEURAL AS NEEDED
Status: DISCONTINUED | OUTPATIENT
Start: 2022-09-19 | End: 2022-09-19 | Stop reason: HOSPADM

## 2022-09-19 RX ORDER — OXYCODONE HYDROCHLORIDE 5 MG/1
5 TABLET ORAL EVERY 4 HOURS PRN
OUTPATIENT
Start: 2022-09-19

## 2022-09-19 RX ORDER — HYDROMORPHONE HYDROCHLORIDE 1 MG/ML
0.4 INJECTION, SOLUTION INTRAMUSCULAR; INTRAVENOUS; SUBCUTANEOUS EVERY 2 HOUR PRN
OUTPATIENT
Start: 2022-09-19

## 2022-09-19 RX ORDER — ONDANSETRON 2 MG/ML
4 INJECTION INTRAMUSCULAR; INTRAVENOUS EVERY 6 HOURS PRN
OUTPATIENT
Start: 2022-09-19

## 2022-09-19 RX ORDER — OXYCODONE HYDROCHLORIDE 5 MG/1
10 TABLET ORAL EVERY 4 HOURS PRN
OUTPATIENT
Start: 2022-09-19

## 2022-09-19 RX ORDER — HYDROCODONE BITARTRATE AND ACETAMINOPHEN 5; 325 MG/1; MG/1
1 TABLET ORAL EVERY 8 HOURS PRN
Qty: 15 TABLET | Refills: 0 | Status: SHIPPED | OUTPATIENT
Start: 2022-09-19

## 2022-09-19 RX ORDER — ACETAMINOPHEN 325 MG/1
650 TABLET ORAL
OUTPATIENT
Start: 2022-09-19

## 2022-09-19 NOTE — BRIEF OP NOTE
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 Raphael Pinto.  Sakshi Carter MD  9/19/2022  8:49 AM

## 2022-09-19 NOTE — OPERATIVE REPORT
[facility]    PATIENT'S NAME: Deshawn Cannon   ATTENDING PHYSICIAN: David Tolbert MD   OPERATING PHYSICIAN: Helio Mcclendon. Jonathon Tolbert MD   PATIENT ACCOUNT#:   [id number]    LOCATION:  [facility] 10  MEDICAL RECORD #:   [id number]       YOB: 1970  ADMISSION DATE:       09/19/2022      OPERATION DATE:  09/19/2022    OPERATIVE REPORT    PREOPERATIVE DIAGNOSIS:    1. Cystic lesion left postauricular skin measuring 1 cm with complex layered closure measuring 2.5 x 1 cm in width. 2.   Multiple cystic lesions of the right postauricular skin measuring 3.5 cm in length with complex layered closure measuring 4 cm x 1.75 cm, 4 cm in length. POSTOPERATIVE DIAGNOSIS:    PROCEDURE:      ANESTHESIA:  Xylocaine 1% with 1:100,000 epinephrine. ESTIMATED BLOOD LOSS:  Less than 5 mL. INDICATIONS:  The patient presents with a history of multiple lesions which appear to be cystic structures behind both ears. Surgical excision is indicated. OPERATIVE TECHNIQUE:  The patient was taken to the operating room, placed in supine position. Following the infiltration of all the lesions behind his ears with 1% Xylocaine with 1:100,000 epinephrine, the patient was then prepped and draped in regular fashion. A 15 blade was used to incise the skin down to the underlying mastoid fascia with removal of some muscular tissue which was invested with scar tissue, what appeared to be cystic process on the left, creating a defect measuring roughly 2.5 x 1 cm in width. Any bleeding sites were controlled using bipolar cautery. A complex layered closure was then performed by approximation of the deep muscular tissues at the level of the mastoid which were closed using 4-0 PDS suture. The more superficial subcutaneous tissues were then closed in a similar fashion, followed by approximation, eversion, and closure of the skin layer using a running locked 5-0 fast-absorbing gut suture.   Attention was then turned to the right side where a similar procedure was performed creating a defect measuring roughly 4 x 1.75 cm with a complex layered closure measuring 4 cm. Tissues were taken deep consisting of multiple cystic structures throughout the skin that was removed down to the underlying mastoid fascia and into the muscular tissues. Bleeding sites were controlled using bipolar cautery. Deep muscular tissues were approximated and closed using a 4-0 PDS suture followed by approximation and closure of the superficial subcutaneous tissues in a similar fashion. The skin edges were then approximated, everted, and closed using a running locked 5-0 fast-absorbing gut suture. A tape and benzoin dressing was placed bilaterally. The patient was then taken to recovery room without any complications. EBL was less than 5 mL. Dictated By Julia Kennedy MD  d: 09/19/2022 08:52:46  t: 09/19/2022 16:34:04  Job [id number]  [facility]/

## 2022-09-19 NOTE — INTERVAL H&P NOTE
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 Leila Hamilton MD on 9/19/2022, the patient was examined and no significant changes have occurred in the patient's condition since the H&P was performed. I discussed with the patient and/or legal representative the potential benefits, risks and side effects of this procedure; the likelihood of the patient achieving goals; and potential problems that might occur during recuperation. I discussed reasonable alternatives to the procedure, including risks, benefits and side effects related to the alternatives and risks related to not receiving this procedure. We will proceed with procedure as planned.

## 2022-09-20 ENCOUNTER — TELEPHONE (OUTPATIENT)
Dept: OTOLARYNGOLOGY | Facility: CLINIC | Age: 52
End: 2022-09-20

## 2022-09-20 NOTE — TELEPHONE ENCOUNTER
Patient POD 1 excision and closure of bilateral post auricular cyst. Per patient, pain is controlled and taking antibiotic. No complaints. Aware to call us for signs of infection. Has appt 9/27/22.

## 2022-09-25 ENCOUNTER — PATIENT MESSAGE (OUTPATIENT)
Dept: OTOLARYNGOLOGY | Facility: CLINIC | Age: 52
End: 2022-09-25

## 2022-09-27 ENCOUNTER — OFFICE VISIT (OUTPATIENT)
Dept: OTOLARYNGOLOGY | Facility: CLINIC | Age: 52
End: 2022-09-27

## 2022-09-27 VITALS — BODY MASS INDEX: 31.41 KG/M2 | HEIGHT: 73 IN | TEMPERATURE: 98 F | WEIGHT: 237 LBS

## 2022-09-27 DIAGNOSIS — L72.0 EPIDERMOID CYST OF SKIN OF POSTAURICULAR REGION: Primary | ICD-10-CM

## 2022-09-27 PROCEDURE — 3008F BODY MASS INDEX DOCD: CPT | Performed by: OTOLARYNGOLOGY

## 2022-09-27 PROCEDURE — 99024 POSTOP FOLLOW-UP VISIT: CPT | Performed by: OTOLARYNGOLOGY

## 2022-09-27 NOTE — TELEPHONE ENCOUNTER
From: Nishi Barnard  To: Jericho Tafoya. Katalina Minaya MD  Sent: 9/25/2022 6:39 AM CDT  Subject: Post surgery    Was just wondering My left ear is ringing and has been doing so for the past couple of days is that normal and will that stop ? I am coming in Tuesday to remove the stitches.

## 2022-09-28 ENCOUNTER — TELEPHONE (OUTPATIENT)
Dept: ADMINISTRATIVE | Facility: HOSPITAL | Age: 52
End: 2022-09-28

## 2022-09-28 DIAGNOSIS — M23.204 DEGENERATIVE TEAR OF LEFT MEDIAL MENISCUS: Primary | ICD-10-CM

## 2022-09-28 DIAGNOSIS — M23.92 INTERNAL DERANGEMENT OF LEFT KNEE: ICD-10-CM

## 2022-09-28 NOTE — TELEPHONE ENCOUNTER
He already has his prescription for therapy. Complete the therapy and do the Home Exercise Program for a month. If still in pain, we can re-submit for the MRI.

## 2022-09-28 NOTE — TELEPHONE ENCOUNTER
Good Morning, Please be advise that the MRI KNEE(LEFT) CPT CODE#98210 has been denied by the Patient Boris Pearce. According to [place] the Dr. Paulo Ridley can call and do a Peer to Peer. Tel#[phone] Option 4.  CEYP#[id number]    Thanks,  Bertram Camarena

## 2024-07-18 ENCOUNTER — OFFICE VISIT (OUTPATIENT)
Facility: LOCATION | Age: 54
End: 2024-07-18

## 2024-07-18 ENCOUNTER — HOSPITAL ENCOUNTER (OUTPATIENT)
Dept: GENERAL RADIOLOGY | Age: 54
Discharge: HOME | End: 2024-07-18
Attending: INTERNAL MEDICINE
Payer: MEDICAID

## 2024-07-18 ENCOUNTER — LAB ENCOUNTER (OUTPATIENT)
Dept: LAB | Age: 54
End: 2024-07-18
Attending: INTERNAL MEDICINE
Payer: MEDICAID

## 2024-07-18 ENCOUNTER — EKG ENCOUNTER (OUTPATIENT)
Dept: LAB | Age: 54
End: 2024-07-18
Attending: INTERNAL MEDICINE
Payer: MEDICAID

## 2024-07-18 VITALS
WEIGHT: 243 LBS | BODY MASS INDEX: 32.2 KG/M2 | HEART RATE: 80 BPM | OXYGEN SATURATION: 95 % | DIASTOLIC BLOOD PRESSURE: 72 MMHG | SYSTOLIC BLOOD PRESSURE: 115 MMHG | HEIGHT: 73 IN

## 2024-07-18 DIAGNOSIS — Z13.228 SCREENING FOR ENDOCRINE, NUTRITIONAL, METABOLIC AND IMMUNITY DISORDER: ICD-10-CM

## 2024-07-18 DIAGNOSIS — Z00.00 ANNUAL PHYSICAL EXAM: Primary | ICD-10-CM

## 2024-07-18 DIAGNOSIS — Z13.0 SCREENING FOR ENDOCRINE, NUTRITIONAL, METABOLIC AND IMMUNITY DISORDER: ICD-10-CM

## 2024-07-18 DIAGNOSIS — M17.12 PRIMARY OSTEOARTHRITIS OF LEFT KNEE: ICD-10-CM

## 2024-07-18 DIAGNOSIS — Z13.21 SCREENING FOR ENDOCRINE, NUTRITIONAL, METABOLIC AND IMMUNITY DISORDER: ICD-10-CM

## 2024-07-18 DIAGNOSIS — Z12.11 COLON CANCER SCREENING: ICD-10-CM

## 2024-07-18 DIAGNOSIS — M25.462 EFFUSION OF BURSA OF LEFT KNEE: ICD-10-CM

## 2024-07-18 DIAGNOSIS — Z01.818 PREOPERATIVE EXAMINATION: ICD-10-CM

## 2024-07-18 DIAGNOSIS — Z13.29 SCREENING FOR ENDOCRINE, NUTRITIONAL, METABOLIC AND IMMUNITY DISORDER: ICD-10-CM

## 2024-07-18 DIAGNOSIS — Z12.5 ENCOUNTER FOR SCREENING FOR MALIGNANT NEOPLASM OF PROSTATE: ICD-10-CM

## 2024-07-18 DIAGNOSIS — Z13.6 ENCOUNTER FOR SCREENING FOR CORONARY ARTERY DISEASE: ICD-10-CM

## 2024-07-18 LAB
ALBUMIN SERPL-MCNC: 4.2 G/DL (ref 3.2–4.8)
ALBUMIN/GLOB SERPL: 1.2 {RATIO} (ref 1–2)
ALP LIVER SERPL-CCNC: 83 U/L
ALT SERPL-CCNC: 19 U/L
ANION GAP SERPL CALC-SCNC: 7 MMOL/L (ref 0–18)
AST SERPL-CCNC: 15 U/L (ref ?–34)
ATRIAL RATE: 68 BPM
BASOPHILS # BLD AUTO: 0.03 X10(3) UL (ref 0–0.2)
BASOPHILS NFR BLD AUTO: 0.4 %
BILIRUB SERPL-MCNC: 0.4 MG/DL (ref 0.3–1.2)
BUN BLD-MCNC: 13 MG/DL (ref 9–23)
BUN/CREAT SERPL: 13 (ref 10–20)
CALCIUM BLD-MCNC: 9.5 MG/DL (ref 8.7–10.4)
CHLORIDE SERPL-SCNC: 108 MMOL/L (ref 98–112)
CHOLEST SERPL-MCNC: 243 MG/DL (ref ?–200)
CO2 SERPL-SCNC: 24 MMOL/L (ref 21–32)
COMPLEXED PSA SERPL-MCNC: 2.04 NG/ML (ref ?–4)
CREAT BLD-MCNC: 1 MG/DL
DEPRECATED RDW RBC AUTO: 41.5 FL (ref 35.1–46.3)
EGFRCR SERPLBLD CKD-EPI 2021: 89 ML/MIN/1.73M2 (ref 60–?)
EOSINOPHIL # BLD AUTO: 0.31 X10(3) UL (ref 0–0.7)
EOSINOPHIL NFR BLD AUTO: 4.1 %
ERYTHROCYTE [DISTWIDTH] IN BLOOD BY AUTOMATED COUNT: 12.8 % (ref 11–15)
EST. AVERAGE GLUCOSE BLD GHB EST-MCNC: 117 MG/DL (ref 68–126)
FASTING PATIENT LIPID ANSWER: NO
FASTING STATUS PATIENT QL REPORTED: NO
GLOBULIN PLAS-MCNC: 3.6 G/DL (ref 2–3.5)
GLUCOSE BLD-MCNC: 110 MG/DL (ref 70–99)
HBA1C MFR BLD: 5.7 % (ref ?–5.7)
HCT VFR BLD AUTO: 45.3 %
HDLC SERPL-MCNC: 40 MG/DL (ref 40–59)
HGB BLD-MCNC: 16.3 G/DL
IMM GRANULOCYTES # BLD AUTO: 0.02 X10(3) UL (ref 0–1)
IMM GRANULOCYTES NFR BLD: 0.3 %
LDLC SERPL CALC-MCNC: 119 MG/DL (ref ?–100)
LYMPHOCYTES # BLD AUTO: 2.34 X10(3) UL (ref 1–4)
LYMPHOCYTES NFR BLD AUTO: 31.3 %
MCH RBC QN AUTO: 32.3 PG (ref 26–34)
MCHC RBC AUTO-ENTMCNC: 36 G/DL (ref 31–37)
MCV RBC AUTO: 89.9 FL
MONOCYTES # BLD AUTO: 0.57 X10(3) UL (ref 0.1–1)
MONOCYTES NFR BLD AUTO: 7.6 %
NEUTROPHILS # BLD AUTO: 4.2 X10 (3) UL (ref 1.5–7.7)
NEUTROPHILS # BLD AUTO: 4.2 X10(3) UL (ref 1.5–7.7)
NEUTROPHILS NFR BLD AUTO: 56.3 %
NONHDLC SERPL-MCNC: 203 MG/DL (ref ?–130)
OSMOLALITY SERPL CALC.SUM OF ELEC: 289 MOSM/KG (ref 275–295)
P AXIS: 66 DEGREES
P-R INTERVAL: 142 MS
PLATELET # BLD AUTO: 227 10(3)UL (ref 150–450)
POTASSIUM SERPL-SCNC: 4 MMOL/L (ref 3.5–5.1)
PROT SERPL-MCNC: 7.8 G/DL (ref 5.7–8.2)
Q-T INTERVAL: 394 MS
QRS DURATION: 100 MS
QTC CALCULATION (BEZET): 418 MS
R AXIS: 25 DEGREES
RBC # BLD AUTO: 5.04 X10(6)UL
SODIUM SERPL-SCNC: 139 MMOL/L (ref 136–145)
T AXIS: 43 DEGREES
TRIGL SERPL-MCNC: 477 MG/DL (ref 30–149)
TSI SER-ACNC: 3.4 MIU/ML (ref 0.55–4.78)
VENTRICULAR RATE: 68 BPM
VIT D+METAB SERPL-MCNC: 25.7 NG/ML (ref 30–100)
VLDLC SERPL CALC-MCNC: 87 MG/DL (ref 0–30)
WBC # BLD AUTO: 7.5 X10(3) UL (ref 4–11)

## 2024-07-18 PROCEDURE — 84443 ASSAY THYROID STIM HORMONE: CPT | Performed by: INTERNAL MEDICINE

## 2024-07-18 PROCEDURE — 99213 OFFICE O/P EST LOW 20 MIN: CPT | Performed by: INTERNAL MEDICINE

## 2024-07-18 PROCEDURE — 93005 ELECTROCARDIOGRAM TRACING: CPT

## 2024-07-18 PROCEDURE — 83036 HEMOGLOBIN GLYCOSYLATED A1C: CPT

## 2024-07-18 PROCEDURE — 85025 COMPLETE CBC W/AUTO DIFF WBC: CPT

## 2024-07-18 PROCEDURE — 99396 PREV VISIT EST AGE 40-64: CPT | Performed by: INTERNAL MEDICINE

## 2024-07-18 PROCEDURE — 80061 LIPID PANEL: CPT | Performed by: INTERNAL MEDICINE

## 2024-07-18 PROCEDURE — 73564 X-RAY EXAM KNEE 4 OR MORE: CPT | Performed by: INTERNAL MEDICINE

## 2024-07-18 PROCEDURE — 93010 ELECTROCARDIOGRAM REPORT: CPT | Performed by: STUDENT IN AN ORGANIZED HEALTH CARE EDUCATION/TRAINING PROGRAM

## 2024-07-18 PROCEDURE — 80053 COMPREHEN METABOLIC PANEL: CPT

## 2024-07-18 PROCEDURE — 36415 COLL VENOUS BLD VENIPUNCTURE: CPT | Performed by: INTERNAL MEDICINE

## 2024-07-18 PROCEDURE — 82306 VITAMIN D 25 HYDROXY: CPT

## 2024-07-18 RX ORDER — PREDNISONE 20 MG/1
20 TABLET ORAL 2 TIMES DAILY WITH MEALS
Qty: 14 TABLET | Refills: 0 | Status: SHIPPED | OUTPATIENT
Start: 2024-07-18 | End: 2024-07-25

## 2024-07-18 RX ORDER — HYDROCODONE BITARTRATE AND ACETAMINOPHEN 5; 325 MG/1; MG/1
1 TABLET ORAL DAILY PRN
Qty: 15 TABLET | Refills: 0 | Status: SHIPPED | OUTPATIENT
Start: 2024-07-18

## 2024-07-18 NOTE — PROGRESS NOTES
INTERNAL MEDICINE ANNUAL EXAM NOTE     Patient ID: Demetrio Kang is a 54 year old male.  Chief Complaint: Pain (Patient here for Left knee pain, started hurting 2 weeks ago) and Physical      Demetrio Kang is a pleasant 54 year old male who presents for annual physical exam. Demetrio Kang is doing well today.  1.  He has left knee pain, Ortho notes reviewed, 2018 he had arthroscopy, he was informed that in the next 5 years he would likely need knee replacement, over the past 2 weeks his knee has worsened and there is swelling, he has been using Aleve most days and Norco as needed last filled 2 years ago.    2.  We reviewed his calcium score of 96 in 2021, given that he will likely need some sort of knee replacement we have discussed starting the preop process, will get echo EKG and we will repeat the calcium score, I rediscussed starting a statin however he declines and would like to wait for further testing.      Health Maintenance  - All care gaps addressed with patient.     Review of Systems  Review of Systems   Constitutional:  Negative for unexpected weight change.   HENT:  Negative for hearing loss.    Eyes:  Negative for pain and visual disturbance.   Respiratory:  Negative for shortness of breath.    Cardiovascular:  Negative for chest pain, palpitations and leg swelling.   Gastrointestinal:  Negative for abdominal pain and blood in stool.   Genitourinary:  Negative for difficulty urinating and hematuria.   Musculoskeletal:  Positive for arthralgias.   Neurological:  Negative for dizziness, tremors, syncope, weakness, light-headedness and headaches.   Psychiatric/Behavioral: Negative.         Physical Exam  Vitals:    07/18/24 0820   BP: 115/72   Pulse: 80   SpO2: 95%   Weight: 243 lb (110.2 kg)   Height: 6' 1\" (1.854 m)     Body mass index is 32.06 kg/m².  BP Readings from Last 3 Encounters:   07/18/24 115/72   09/19/22 142/77   09/09/22 118/73     Physical Exam  Vitals and nursing note reviewed.    Constitutional:       General: He is not in acute distress.     Appearance: Normal appearance.   HENT:      Head: Normocephalic.      Right Ear: External ear normal.      Left Ear: External ear normal.   Eyes:      Extraocular Movements: Extraocular movements intact.      Conjunctiva/sclera: Conjunctivae normal.   Cardiovascular:      Rate and Rhythm: Normal rate and regular rhythm.      Pulses: Normal pulses.      Heart sounds: Normal heart sounds.   Pulmonary:      Effort: Pulmonary effort is normal. No respiratory distress.      Breath sounds: Normal breath sounds. No wheezing.   Abdominal:      General: Abdomen is flat. Bowel sounds are normal.      Tenderness: There is no abdominal tenderness.   Musculoskeletal:      Cervical back: Normal range of motion and neck supple.      Left knee: Deformity, effusion and bony tenderness present. Decreased range of motion. Tenderness present.   Skin:     Coloration: Skin is not jaundiced.   Neurological:      General: No focal deficit present.      Mental Status: He is alert and oriented to person, place, and time. Mental status is at baseline.   Psychiatric:         Mood and Affect: Mood normal.         Behavior: Behavior normal.           Labs & Imaging  Pertinent labs and imaging reviewed.   Lab Results   Component Value Date     (H) 09/09/2022    BUN 16 09/09/2022    BUNCREA 15.5 09/09/2022    CREATSERUM 1.03 09/09/2022    ANIONGAP 9 09/09/2022    GFRNAA 92 08/19/2020    GFRAA 106 08/19/2020    CA 9.0 09/09/2022    OSMOCALC 297 (H) 09/09/2022    ALKPHO 85 09/09/2022    AST 15 09/09/2022    ALT 23 09/09/2022    BILT 0.6 09/09/2022    TP 7.7 09/09/2022    ALB 3.7 09/09/2022    GLOBULIN 4.0 09/09/2022     09/09/2022    K 4.8 09/09/2022     09/09/2022    CO2 26.0 09/09/2022     Lab Results   Component Value Date     09/09/2022    A1C 5.7 (H) 09/09/2022     Lab Results   Component Value Date    WBC 6.0 09/09/2022    RBC 4.91 09/09/2022    HGB  15.3 09/09/2022    HCT 47.0 09/09/2022    MCV 95.7 09/09/2022    MCH 31.2 09/09/2022    MCHC 32.6 09/09/2022    RDW 13.0 09/09/2022    .0 09/09/2022    MPV 9.1 05/31/2018     Lab Results   Component Value Date    CHOLEST 222 (H) 09/09/2022    TRIG 330 (H) 09/09/2022    HDL 36 (L) 09/09/2022     (H) 09/09/2022    VLDL 60 (H) 09/09/2022    NONHDLC 186 (H) 09/09/2022     The 10-year ASCVD risk score (Mert BURNHAM, et al., 2019) is: 6.5%    Values used to calculate the score:      Age: 54 years      Sex: Male      Is Non- : No      Diabetic: No      Tobacco smoker: No      Systolic Blood Pressure: 115 mmHg      Is BP treated: No      HDL Cholesterol: 36 mg/dL      Total Cholesterol: 222 mg/dL    Medical History    Reviewed allergies:  Allergies   Allergen Reactions    Seasonal Runny nose        Reviewed:  Patient Active Problem List    Diagnosis    Elevated coronary artery calcium score     2021: 96      Obesity (BMI 30.0-34.9)    Mixed hyperlipidemia    Chronic obstructive pulmonary disease (HCC)      Reviewed:  Past Medical History:    Bell's palsy    Hyperlipidemia    Vitamin D deficiency      Reviewed:  Family History   Problem Relation Age of Onset    Cancer Mother     Stroke Neg     Heart Disease Neg        Reviewed:  Past Surgical History:   Procedure Laterality Date    Ankle fracture surgery Right 2005    Dental surgery procedure  2016    total extraction    Exc skin benig 2.1-3cm face,facial  09/19/2022    Excise lesn neck/chest,subcutan Right 05/01/2017    epidermal inclusion cyst    Repr cmpl wnd lid,nos,ear 2.5-7.5  09/19/2022    Repr cmpl wnd lid,nos,ear 2.5-7.5  09/19/2022    Skin surgery  02/28/2018    L mid back and R lateral back epidermal inclusion cysts      Reviewed:  Social History     Socioeconomic History    Marital status: Single    Number of children: 0   Occupational History    Occupation:      Comment: unemployed   Tobacco Use    Smoking status: Former      Current packs/day: 0.00     Average packs/day: 1 pack/day for 20.0 years (20.0 ttl pk-yrs)     Types: Cigarettes     Start date: 1999     Quit date: 2019     Years since quittin.1    Smokeless tobacco: Former   Vaping Use    Vaping status: Never Used   Substance and Sexual Activity    Alcohol use: Yes     Alcohol/week: 0.0 standard drinks of alcohol     Comment: occasionally    Drug use: Yes     Types: Cannabis     Comment: daily   Other Topics Concern    Grew up on a farm No    Outdoor occupation Yes    Pt has a pacemaker No    Pt has a defibrillator No    Reaction to local anesthetic No      Reviewed:  Current Outpatient Medications   Medication Sig Dispense Refill    predniSONE 20 MG Oral Tab Take 1 tablet (20 mg total) by mouth 2 (two) times daily with meals for 7 days. 14 tablet 0    HYDROcodone-acetaminophen (NORCO) 5-325 MG Oral Tab Take 1 tablet by mouth daily as needed for Pain. 15 tablet 0          Assessment & Plan    1. Annual physical exam  - CBC, Platelet; No Differential  - Lipid Panel  - TSH W Reflex To Free T4    2. Primary osteoarthritis of left knee  - Ortho Referral - In Network  - XR KNEE, COMPLETE (4 OR MORE VIEWS), LEFT (CPT=73564); Future  - HYDROcodone-acetaminophen (NORCO) 5-325 MG Oral Tab; Take 1 tablet by mouth daily as needed for Pain.  Dispense: 15 tablet; Refill: 0    3. Preoperative examination  - CARD ECHO 2D DOPPLER (CPT=93306); Future  - EKG 12 Lead to be performed at [facility]; Future    4. Screening for endocrine, nutritional, metabolic and immunity disorder  - Hemoglobin A1C [E]; Future  - Comp Metabolic Panel (14) [E]; Future  - CBC W Differential W Platelet [E]; Future  - Vitamin D [E]; Future  - CBC, Platelet; No Differential  - Lipid Panel  - TSH W Reflex To Free T4    5. Effusion of bursa of left knee  - predniSONE 20 MG Oral Tab; Take 1 tablet (20 mg total) by mouth 2 (two) times daily with meals for 7 days.  Dispense: 14 tablet; Refill:  0    6. Encounter for screening for malignant neoplasm of prostate  - PSA Total, Screen    7. Colon cancer screening  - [facility] GI Telephone Colon Screen  - GASTRO - INTERNAL    8. Encounter for screening for coronary artery disease  - CT CALCIUM SCORING; Future  Plan  Overall doing well today. Screening labs, preventive imaging/procedure, and health care gaps addressed as per USPSTF guidelines, orders available electronically via [employer] software and in AVS.  Vaccines discussed and administered depending on availability and per patient preference; patient to return for any outstanding vaccines once available.  Patient brought to  to help schedule care gaps and follow up. Further recommendations depending on lab results.  In terms of the knee we will give him a course of prednisone to alleviate some of the effusion however given that he is in territory of needing replacement I have asked him to get knee x-ray weightbearing, he will follow-up with Ortho, in the meantime we will get EKG echo and start the preop process, we will also check calcium scoring to evaluate for any worsening, he declines statin however if calcium score is worsened we will reevaluate.          Follow Up:   Return for DEPENDING ON RESULTS or pre-op evaluation. .      Masoud Jo MD  Internal Medicine      Patient asked to sign release of information for outside records if not already requested, make future office/imaging appointments at the  prior to leaving, and to sign up for [employer] if not already active.  Preventive measures and further education discussed with patient as per after visit summary. Potential medication side effects discussed. All questions answered to best of ability.   Call office with any questions. Seek emergency care if necessary.   Patient understands and agrees to follow directions and advice.      ----------------------------------------- PATIENT INSTRUCTIONS-----------------------------------------     There  are no Patient Instructions on file for this visit.

## 2025-02-27 NOTE — PROGRESS NOTES
History of Present Illness   Patient ID: Allen Mata is a 48year old male.   Chief Complaint: Elbow Pain (1xweek bilateral pain 10/10) and Chest Pain (yesterday, R side 5/10)      HPI   Pleasant 49-year-old gentleman who presents for bilateral elbow emeterio HENT:      Head: Normocephalic. Right Ear: External ear normal.      Left Ear: External ear normal.   Eyes:      Extraocular Movements: Extraocular movements intact.       Conjunctiva/sclera: Conjunctivae normal.   Cardiovascular:      Rate and Rhythm: refill for [place] from prior PCP, advised this is to be used for severe pain only. He is agreeable. Further recommendations depending on his progress, would likely benefit from occupational/physical therapy.     2. Chest pain, unspecified type  - CARD ECHO Cody Downey, et al., 2013) is: 6.5%    Values used to calculate the score:      Age: 48 years      Sex: Male      Is Non- : No      Diabetic: No      Tobacco smoker: No      Systolic Blood Pressure: 587 mmHg      Is BP treated: No      H PAST MEDICAL HISTORY:  At risk for sleep apnea     CAD (coronary artery disease)     CKD (chronic kidney disease)     Dissecting aortic aneurysm     HTN (hypertension)     Respiratory arrest     Uncontrolled hypertension

## 2025-03-29 ENCOUNTER — TELEPHONE (OUTPATIENT)
Facility: LOCATION | Age: 55
End: 2025-03-29

## 2025-03-29 NOTE — TELEPHONE ENCOUNTER
Please relay to patient or Listed contact if unable to reach:  Lucien Jo is unfortunately no longer with our organization,  My name is Dr. Yoder and I'm located in [facility] and assigned to his former patients thru [facility].   I am reaching out in regards to remind you that  you are due [facility] visit with New Physician and Colorectal cancer screening: given your age of greater than 45 I highly recommend you get your colorectal cancer screening if you decline screening colonoscopy a non invasive option is a stool Cologuard test which is done every three years. For average risk patients. Please follow up in clinic and we can discuss which option is best for you. I strive for delivering High quality/value care to my patients. If you have any questions, please schedule follow up with me  If you have already had a colonoscopy or Cologuard recently completed, please send Fax the records to our office [phone] However if you have established care with another provider please call our office [phone] or send us a [employer] message and we will remove your name from our list to indicate you have a new provider and will gladly send records to them if requested. for more information,  A Video on how to collect the cologuard sample on youtube: titled: How to properly use Cologuard colon cancer at-home test kit https://[email or web address] Softwareube.com/watch?[email or web address]    Please complete this within the next month as I value providing high value evidence based medical care and prevention and would like to go over the next steps needed In your wellness endeavor.     However if you have established care with another provider please call our office [phone] or send us a [employer] message and we will remove your name from our list to indicate you have a new provider and will gladly send records to them if requested.      Wish you all the best in your endeavor for better health  -  Mor Yoder MD, MPH

## 2025-03-31 ENCOUNTER — TELEPHONE (OUTPATIENT)
Facility: LOCATION | Age: 55
End: 2025-03-31

## 2025-04-02 ENCOUNTER — TELEPHONE (OUTPATIENT)
Dept: INTERNAL MEDICINE CLINIC | Facility: CLINIC | Age: 55
End: 2025-04-02

## 2025-04-02 ENCOUNTER — OFFICE VISIT (OUTPATIENT)
Dept: INTERNAL MEDICINE CLINIC | Facility: CLINIC | Age: 55
End: 2025-04-02

## 2025-04-02 ENCOUNTER — HOSPITAL ENCOUNTER (OUTPATIENT)
Dept: GENERAL RADIOLOGY | Age: 55
Discharge: HOME | End: 2025-04-02
Attending: STUDENT IN AN ORGANIZED HEALTH CARE EDUCATION/TRAINING PROGRAM
Payer: MEDICAID

## 2025-04-02 VITALS
BODY MASS INDEX: 33.53 KG/M2 | OXYGEN SATURATION: 97 % | HEART RATE: 89 BPM | HEIGHT: 73 IN | SYSTOLIC BLOOD PRESSURE: 116 MMHG | TEMPERATURE: 98 F | WEIGHT: 253 LBS | DIASTOLIC BLOOD PRESSURE: 70 MMHG

## 2025-04-02 DIAGNOSIS — H60.331 ACUTE SWIMMER'S EAR OF RIGHT SIDE: ICD-10-CM

## 2025-04-02 DIAGNOSIS — J44.1 COPD WITH ACUTE EXACERBATION (HCC): ICD-10-CM

## 2025-04-02 DIAGNOSIS — M17.12 PRIMARY OSTEOARTHRITIS OF LEFT KNEE: ICD-10-CM

## 2025-04-02 DIAGNOSIS — E78.2 MIXED HYPERLIPIDEMIA: ICD-10-CM

## 2025-04-02 DIAGNOSIS — Z12.11 COLON CANCER SCREENING: Primary | ICD-10-CM

## 2025-04-02 DIAGNOSIS — J43.2 CENTRILOBULAR EMPHYSEMA (HCC): Primary | ICD-10-CM

## 2025-04-02 PROCEDURE — 71046 X-RAY EXAM CHEST 2 VIEWS: CPT | Performed by: STUDENT IN AN ORGANIZED HEALTH CARE EDUCATION/TRAINING PROGRAM

## 2025-04-02 PROCEDURE — 73562 X-RAY EXAM OF KNEE 3: CPT | Performed by: STUDENT IN AN ORGANIZED HEALTH CARE EDUCATION/TRAINING PROGRAM

## 2025-04-02 PROCEDURE — 99215 OFFICE O/P EST HI 40 MIN: CPT | Performed by: STUDENT IN AN ORGANIZED HEALTH CARE EDUCATION/TRAINING PROGRAM

## 2025-04-02 RX ORDER — NEOMYCIN SULFATE, POLYMYXIN B SULFATE, HYDROCORTISONE 3.5; 10000; 1 MG/ML; [USP'U]/ML; MG/ML
3 SOLUTION/ DROPS AURICULAR (OTIC) 3 TIMES DAILY
Qty: 1 EACH | Refills: 2 | Status: SHIPPED | OUTPATIENT
Start: 2025-04-02 | End: 2025-04-07

## 2025-04-02 RX ORDER — HYDROCODONE BITARTRATE AND ACETAMINOPHEN 5; 325 MG/1; MG/1
1 TABLET ORAL DAILY PRN
Qty: 15 TABLET | Refills: 0 | Status: SHIPPED | OUTPATIENT
Start: 2025-04-02

## 2025-04-02 RX ORDER — FLUTICASONE PROPIONATE AND SALMETEROL 250; 50 UG/1; UG/1
1 POWDER RESPIRATORY (INHALATION) 2 TIMES DAILY
Qty: 1 EACH | Refills: 3 | Status: SHIPPED | OUTPATIENT
Start: 2025-04-02 | End: 2025-04-07

## 2025-04-02 RX ORDER — FLUTICASONE PROPIONATE AND SALMETEROL 250; 50 UG/1; UG/1
1 POWDER RESPIRATORY (INHALATION) 2 TIMES DAILY
Qty: 1 EACH | Refills: 3 | Status: SHIPPED | OUTPATIENT
Start: 2025-04-02 | End: 2025-04-02

## 2025-04-02 RX ORDER — ALBUTEROL SULFATE 90 UG/1
1 INHALANT RESPIRATORY (INHALATION)
Qty: 3 EACH | Refills: 9 | Status: SHIPPED | OUTPATIENT
Start: 2025-04-02 | End: 2025-04-02

## 2025-04-02 RX ORDER — OMEGA-3 FATTY ACIDS/FISH OIL 300-1000MG
1000 CAPSULE ORAL DAILY
Qty: 90 CAPSULE | Refills: 3 | Status: SHIPPED | OUTPATIENT
Start: 2025-04-02 | End: 2025-05-02

## 2025-04-02 RX ORDER — ALBUTEROL SULFATE 90 UG/1
1 INHALANT RESPIRATORY (INHALATION)
Qty: 3 EACH | Refills: 9 | Status: SHIPPED | OUTPATIENT
Start: 2025-04-02

## 2025-04-02 RX ORDER — AZITHROMYCIN 250 MG/1
TABLET, FILM COATED ORAL
Qty: 6 TABLET | Refills: 0 | Status: SHIPPED | OUTPATIENT
Start: 2025-04-02 | End: 2025-04-07

## 2025-04-02 RX ORDER — PREDNISONE 10 MG/1
TABLET ORAL
Qty: 18 TABLET | Refills: 0 | Status: SHIPPED | OUTPATIENT
Start: 2025-04-02 | End: 2025-04-11

## 2025-04-02 NOTE — PATIENT INSTRUCTIONS
Lifestyle Changes Recommendations:   Nutritional Goals Reviewed and Discussed:   Limit Carbohydrates to 100gm per day, Eat 100-200 calories within 1 hour of awkaing up, Eat 3-4 cups of fresh fruits or vegetables daily    Behavior Modification Reviewed and Discussed:  Eat breakfast, Eat 3 meals per day, Plan meals in advance (if unable to cook, meal prep service such as [id number]) High protein, Low simple carbs. Read nutrition labels track calories and Macros with [employer] or [employer] luciano (thus daily food journal), No drinking 30mintutes before or after meals, utilize portion control strategies to reduce caloric intake, Identify triggers for eating and manage cues, and Eat Slowly and take 20-30 minutes to complete each meal.    Goal for Next Month:  Keep Food log: At first track current daily caloric intake and limit current caloric consumption by 500 to 1,000 calories daily OR start with caloric restriction of less than 1,800 calories daily.   Increase Cardiac/cardio exercise at least 30minutes a day/ 180 minutes a week, ideal Goal is 1hr a day (5 days a week) would prefer if enrolls in fitness classes or  at least 1x a week. (If possible to work out in the morning is proven to increase natural Dopamine, Serotonin, Endorphin, levels (Feel good and energy hormones) and reduce cortisol  (stress hormone levels).   Drink 48-64 ounces of non-caloric beverages per day. No fruit juices or regular soda.  Increase activity- upper body exercises in addition to cardio and, aim to walk 10minutes per day after dinner  Increase fruit and vegetable servings to 5-6 per day.   6. Food recommendation for Breakfast: Steel cut oatmeal:  1cup Steel cut oatmeal and 2cups unsweetened almond milk. And cinnamon (let it ,overnight in a bowl), and portion out 4 servings (separate containers/jars), then daily add one triple zero okios greek yogurt, 1 medium banana and however many berries your heart desires  All berries are  good, (blueberry, raspberry, strawberries, blackberries).   -avoid high sugar fruits (pineapple, mangos, melons, banana)- high  7. Plant based protein: Ascent Plant Based Protein Powder - Non Dairy Vegan Protein, Zero Artificial Ingredients, Soy & Gluten Free, No Added Sugar, 4g BCAA, 2g Leucine - Chocolate, 18 Servings

## 2025-04-02 NOTE — TELEPHONE ENCOUNTER
Patient name and  verified, seen in office today and would like to make Dr. Mor Yoder new pcp.  order placed for pcp removal/change

## 2025-04-02 NOTE — TELEPHONE ENCOUNTER
Prior auth and chart notes required for Fluticasone-Salmeterol 250-50.  Chart notes open, please sign and route back to triage support to submit prior auth.

## 2025-04-02 NOTE — PROGRESS NOTES
OFFICE NOTE       The following individual(s) verbally consented to be recorded using ambient AI listening technology and understand that they can each withdraw their consent to this listening technology at any point by asking the clinician to turn off or pause the recording:    Patient name: Demetrio Kang  Additional names:            Patient ID: Demetrio Kang is a 54 year old male.  Today's Date: 04/02/25  Chief Complaint: Establish Care and Nasal Congestion (Head congestion, swimmers ear, and difficulty breathing, wheezing x10 days)      History of Present Illness  Demetrio Kang is a 54 year old male with COPD who presents with high congestion, swimmer's ear, difficulty breathing, and wheezing for the last ten days.    He has been experiencing high congestion, difficulty breathing, and wheezing for the last ten days. He quit smoking five years ago due to wheezing, which had resolved until recently. He describes coughing up phlegm and hearing himself wheeze. He has a history of COPD diagnosed a year ago and has not been using any inhalers or medications for it. 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 He continues to use marijuana, preferring edibles over smoking. No fever reported.    He mentions having 'swimmer's ear' in his left ear, which feels like it wants to pop but doesn't. He has been using ear drops previously prescribed, which include neomycin, polymyxin, and a steroid.    His past medical history includes hyperlipidemia, with last lab results from July 18, 2024, showing total cholesterol of 243, triglycerides of 477, and LDL of 118. He is not currently on any cholesterol medications but tries to manage his diet by reducing red meat intake and increasing physical activity.    He has a history of obesity, previously weighing 330 pounds a year and a half ago, and has been making lifestyle changes to address  this.    He also reports a history of left knee issues, having undergone a procedure to 'shave' the knee rather than replace it. He experiences occasional pain, which he attributes to age, and has been prescribed hydrocodone in the past for pain management. He mentions having two pills left from a previous prescription.       Vitals:    04/02/25 0832   BP: 116/70   Pulse: 89   Temp: 97.9 °F (36.6 °C)   TempSrc: Tympanic   SpO2: 97%   Weight: 253 lb (114.8 kg)   Height: 6' 1\" (1.854 m)     body mass index is 33.38 kg/m².  BP Readings from Last 3 Encounters:   04/02/25 116/70   07/18/24 115/72   09/19/22 142/77     The 10-year ASCVD risk score (Mert BURNHAM, et al., 2019) is: 6.7%    Values used to calculate the score:      Age: 54 years      Sex: Male      Is Non- : No      Diabetic: No      Tobacco smoker: No      Systolic Blood Pressure: 116 mmHg      Is BP treated: No      HDL Cholesterol: 40 mg/dL      Total Cholesterol: 243 mg/dL  Results  LABS  Total cholesterol: 243 mg/dL (07/18/2024)  Triglycerides: 477 mg/dL (07/18/2024)  LDL: 118 mg/dL (07/18/2024)    RADIOLOGY  Calcium CT scan: 92.6 (2021)       Medications reviewed:  Current Outpatient Medications   Medication Sig Dispense Refill    neomycin-polymyxin-hydrocortisone 3.5-17918-5 Otic Solution Place 3 drops into both ears 3 (three) times daily for 5 days. 1 each 2    Omega 3 1000 MG Oral Cap Take 1,000 mg by mouth daily. 90 capsule 3    predniSONE 10 MG Oral Tab Take 3 tablets (30 mg total) by mouth daily for 3 days, THEN 2 tablets (20 mg total) daily for 3 days, THEN 1 tablet (10 mg total) daily for 3 days. TAKE WITH FOOD.. 18 tablet 0    azithromycin 250 MG Oral Tab Take 2 tablets (500 mg total) by mouth daily for 1 day, THEN 1 tablet (250 mg total) daily for 4 days. 6 tablet 0    HYDROcodone-acetaminophen (NORCO) 5-325 MG Oral Tab Take 1 tablet by mouth daily as needed for Pain. 15 tablet 0    albuterol 108 (90 Base) MCG/ACT  Inhalation Aero Soln Inhale 1 puff into the lungs every 4 to 6 hours as needed for Wheezing or Shortness of Breath. FOR ASTHMA (RESCUE INHALER) Pharmacist, please switch to formulary alternative per insurance coverage, ok to replace with proair, ventolin, proventil, or any other albuterol inhaler. 3 each 9    fluticasone-salmeterol 250-50 MCG/ACT Inhalation Aerosol Powder, Breath Activated Inhale 1 puff into the lungs 2 (two) times daily. For ASTHMA/COPD STEROID INHALER (WASH MOUTH AFTER USE) 1 each 3         Assessment & Plan    1. Colon cancer screening (Primary)  -     COLOGUARD COLON CANCER SCREENING (EXTERNAL)  2. Acute swimmer's ear of right side  -     ENT - INTERNAL  -     Neomycin-Polymyxin-HC; Place 3 drops into both ears 3 (three) times daily for 5 days.  Dispense: 1 each; Refill: 2  3. Mixed hyperlipidemia  -     Omega 3; Take 1,000 mg by mouth daily.  Dispense: 90 capsule; Refill: 3  4. COPD with acute exacerbation (HCC)  -     predniSONE; Take 3 tablets (30 mg total) by mouth daily for 3 days, THEN 2 tablets (20 mg total) daily for 3 days, THEN 1 tablet (10 mg total) daily for 3 days. TAKE WITH FOOD..  Dispense: 18 tablet; Refill: 0  -     Azithromycin; Take 2 tablets (500 mg total) by mouth daily for 1 day, THEN 1 tablet (250 mg total) daily for 4 days.  Dispense: 6 tablet; Refill: 0  -     Discontinue: Fluticasone-Salmeterol; Inhale 1 puff into the lungs 2 (two) times daily.  Dispense: 1 each; Refill: 3  -     XR CHEST PA + LAT CHEST (CPT=71046); Future; Expected date: 04/02/2025  -     Pulmonary Function Test; Future; Expected date: 04/02/2025  -     Albuterol Sulfate HFA; Inhale 1 puff into the lungs every 4 to 6 hours as needed for Wheezing or Shortness of Breath. FOR ASTHMA (RESCUE INHALER) Pharmacist, please switch to formulary alternative per insurance coverage, ok to replace with proair, ventolin, proventil, or any other albuterol inhaler.  Dispense: 3 each; Refill: 9  -      Fluticasone-Salmeterol; Inhale 1 puff into the lungs 2 (two) times daily. For ASTHMA/COPD STEROID INHALER (WASH MOUTH AFTER USE)  Dispense: 1 each; Refill: 3  5. Primary osteoarthritis of left knee  -     HYDROcodone-Acetaminophen; Take 1 tablet by mouth daily as needed for Pain.  Dispense: 15 tablet; Refill: 0  -     XR KNEE (3 VIEWS), LEFT (CPT=73562); Future; Expected date: 04/02/2025  Other orders  -     Discontinue: Albuterol Sulfate HFA; Inhale 1 puff into the lungs every 4 to 6 hours as needed. FOR ASTHMA. Pharmacist, please switch to formulary alternative per insurance coverage, ok to replace with proair, ventolin, proventil, or any other albuterol inhaler. -drkp  Dispense: 3 each; Refill: 9    Assessment & Plan  COPD exacerbation  Exacerbation likely due to environmental exposures. Discussed azithromycin for antibiotic and anti-inflammatory effects. Introduced inhalers for symptom management. Advised avoiding lung irritants, including smoking and marijuana smoke, suggested transitioning to edibles.  - Prescribe azithromycin for its antibiotic and anti-inflammatory properties.  - Prescribe a steroid inhaler, one puff twice daily.  - Prescribe albuterol as a rescue inhaler, to be used as needed.  - Order a chest x-ray to assess lung status.  - Order a pulmonary function test to evaluate COPD severity.    Swimmer's ear (otitis externa)  Symptoms suggest otitis externa. Discussed neomycin, polymyxin, and steroid ear drops. Consider ENT referral if symptoms persist.  - Prescribe neomycin, polymyxin, and steroid ear drops, three times daily for one week.  - If symptoms persist beyond ten days, refer to ENT specialist Dr. Ngo in [place].    Hyperlipidemia  Elevated cholesterol and triglycerides. Prefers lifestyle modifications. Discussed dietary changes, exercise, and fish oil supplements.  - Advise dietary modifications, reducing red meat intake to two to three times a month, increasing chicken, turkey, and  seafood consumption.  - Recommend regular cardiovascular exercise to achieve a heart rate of 120 bpm for three to five hours weekly.  - Suggest fish oil supplements to help lower triglyceride levels.  - Plan to reassess lipid levels and consider a repeat calcium CT scan in the future.    Colorectal cancer screening  Due for screening. Sent Cologuard kit for non-invasive screening.  - Ensure completion and return of the Cologuard kit for colorectal cancer screening.    Osteoarthritis of the left knee  Occasional pain managed with Norco. Discussed potential future knee replacement and imaging to assess progression.  - Prescribe Norco for pain management, with a limited supply as requested.  - Consider imaging of the left knee to assess osteoarthritis progression and need for further intervention.    Follow-up  Due for physical examination in July. Plans to reassess conditions at that time.  - Schedule a follow-up appointment for a physical examination after July 18th.  - Advise to contact the clinic if symptoms worsen before the scheduled follow-up.       Follow Up: As needed/if symptoms worsen or Return in about 4 months (around 7/18/2025) for Annual Physical ..     Objective/ Results:   Physical Exam  Constitutional:       Appearance: He is well-developed.   Cardiovascular:      Rate and Rhythm: Normal rate and regular rhythm.      Heart sounds: Normal heart sounds.   Pulmonary:      Effort: Pulmonary effort is normal.      Breath sounds: Normal breath sounds.   Abdominal:      General: Bowel sounds are normal.      Palpations: Abdomen is soft.   Skin:     General: Skin is warm and dry.   Neurological:      Mental Status: He is alert and oriented to person, place, and time.      Deep Tendon Reflexes: Reflexes are normal and symmetric.        Physical Exam  CHEST: Wheezing on auscultation.     Reviewed:    Patient Active Problem List    Diagnosis    Elevated coronary artery calcium score     2021: 96      Obesity  (BMI 30.0-34.9)    Mixed hyperlipidemia    Chronic obstructive pulmonary disease (HCC)      Allergies[1]     Social History     Socioeconomic History    Marital status: Single    Number of children: 0   Occupational History    Occupation:      Comment: unemployed   Tobacco Use    Smoking status: Former     Current packs/day: 0.00     Average packs/day: 1 pack/day for 20.0 years (20.0 ttl pk-yrs)     Types: Cigarettes     Start date: 1999     Quit date: 2019     Years since quittin.8    Smokeless tobacco: Former   Vaping Use    Vaping status: Never Used   Substance and Sexual Activity    Alcohol use: Yes     Alcohol/week: 0.0 standard drinks of alcohol     Comment: occasionally    Drug use: Yes     Types: Cannabis     Comment: daily   Other Topics Concern    Grew up on a farm No    Outdoor occupation Yes    Pt has a pacemaker No    Pt has a defibrillator No    Reaction to local anesthetic No      Review of Systems   Constitutional: Negative.    Respiratory:  Positive for cough and wheezing.    Cardiovascular: Negative.    Gastrointestinal: Negative.    Skin: Negative.    Neurological: Negative.        All other systems negative unless otherwise stated in ROS or HPI above.       Mor Yoder MD  Internal Medicine       Call office with any questions or seek emergency care if necessary.   Patient understands and agrees to follow directions and advice.      ----------------------------------------- PATIENT INSTRUCTIONS-----------------------------------------     Patient Instructions     Lifestyle Changes Recommendations:   Nutritional Goals Reviewed and Discussed:   Limit Carbohydrates to 100gm per day, Eat 100-200 calories within 1 hour of awkaing up, Eat 3-4 cups of fresh fruits or vegetables daily    Behavior Modification Reviewed and Discussed:  Eat breakfast, Eat 3 meals per day, Plan meals in advance (if unable to cook, meal prep service such as [employer]) High protein, Low simple carbs. Read  nutrition labels track calories and Macros with [employer] or [employer] (thus daily food journal), No drinking 30mintutes before or after meals, utilize portion control strategies to reduce caloric intake, Identify triggers for eating and manage cues, and Eat Slowly and take 20-30 minutes to complete each meal.    Goal for Next Month:  Keep Food log: At first track current daily caloric intake and limit current caloric consumption by 500 to 1,000 calories daily OR start with caloric restriction of less than 1,800 calories daily.   Increase Cardiac/cardio exercise at least 30minutes a day/ 180 minutes a week, ideal Goal is 1hr a day (5 days a week) would prefer if enrolls in fitness classes or  at least 1x a week. (If possible to work out in the morning is proven to increase natural Dopamine, Serotonin, Endorphin, levels (Feel good and energy hormones) and reduce cortisol  (stress hormone levels).   Drink 48-64 ounces of non-caloric beverages per day. No fruit juices or regular soda.  Increase activity- upper body exercises in addition to cardio and, aim to walk 10minutes per day after dinner  Increase fruit and vegetable servings to 5-6 per day.   6. Food recommendation for Breakfast: Steel cut oatmeal:  1cup Steel cut oatmeal and 2cups unsweetened almond milk. And cinnamon (let it ,overnight in a bowl), and portion out 4 servings (separate containers/jars), then daily add one triple zero okios greek yogurt, 1 medium banana and however many berries your heart desires  All berries are good, (blueberry, raspberry, strawberries, blackberries).   -avoid high sugar fruits (pineapple, mangos, melons, banana)- high  7. Plant based protein: Ascent Plant Based Protein Powder - Non Dairy Vegan Protein, Zero Artificial Ingredients, Soy & Gluten Free, No Added Sugar, 4g BCAA, 2g Leucine - Chocolate, 18 Servings          The 21st Century Cures Act makes medical notes available to patients in the interest of  transparency.  However, please be advised that this is a medical document.  It is intended as a peer to peer communication.  It is written in medical language and may contain abbreviations or verbiage that are technical and unfamiliar.  It may appear blunt or direct.  Medical documents are intended to carry relevant information, facts as evident, and the clinical opinion of the practitioner.          [1]   Allergies  Allergen Reactions    Seasonal Runny nose

## 2025-04-03 NOTE — TELEPHONE ENCOUNTER
Fluticasone-salmeterol 250-50 MCG/ACT denied, brand is preferred. Called and notified pharmacy.

## 2025-04-03 NOTE — TELEPHONE ENCOUNTER
Denied    Note from payer: Details of this decision are provided on the physician outcome notice which has been faxed to the number on file.  Payer: [facility] Case ID: [id number]    [phone]  Electronic appeal: Not supported  View History

## 2025-04-04 ENCOUNTER — PATIENT OUTREACH (OUTPATIENT)
Dept: CASE MANAGEMENT | Age: 55
End: 2025-04-04

## 2025-04-04 NOTE — PROCEDURES
Received order requesting to update Primary Care Physician (PCP) to Dr. Mor Yoder is Approved and finalized on April 4, 2025.    Removed Masoud Jo MD as the patient's Primary Care Physician

## 2025-04-05 NOTE — PROGRESS NOTES
Please relay to patient if not read:     Dr. Beba Chow here,   Your chest xray shows no pneumonia, you have a history of healed chronic left rib fractures and a elevated left hemidiaphragm which might make it likely for you to have worse COPD exacerbations  -Dr. Yoder

## 2025-04-07 RX ORDER — BUDESONIDE AND FORMOTEROL FUMARATE DIHYDRATE 160; 4.5 UG/1; UG/1
2 AEROSOL RESPIRATORY (INHALATION) 2 TIMES DAILY
Qty: 10.2 G | Refills: 11 | Status: SHIPPED | OUTPATIENT
Start: 2025-04-07

## 2025-06-25 ENCOUNTER — TELEPHONE (OUTPATIENT)
Dept: INTERNAL MEDICINE CLINIC | Facility: CLINIC | Age: 55
End: 2025-06-25

## 2025-06-26 NOTE — TELEPHONE ENCOUNTER
Lucien Kang    This is Dr. Yoder  I am reaching out in regards to remind you that  you are due for Colorectal cancer screening: given your age of greater than 45 I highly recommend you get your colorectal cancer screening if you decline screening colonoscopy a non invasive option is a stool Cologuard test which is done every three years, For average risk patients. Please follow up in clinic and we can discuss which option is best for you. I strive for delivering High quality/value care to my patients. If you have any questions, please schedule follow up with me  If you have already had a colonoscopy or Cologuard recently completed, please send Fax the records to our office [phone] However if you have established care with another provider please call our office [phone] or send us a [employer]PI [employer]) message and we will remove your name from our list to indicate you have a new provider and will gladly send records to them if requested. for more information,  A Video on how to collect the cologuard sample on youtube: titled: How to properly use Cologuard colon cancer at-home test kit https://www.[employer].com/watch?v=IyudkXuThhI    Please complete this within the next month as I value providing high value evidence based medical care and prevention and would like to go over the next steps needed In your wellness endeavor.     However if you have established care with another provider please call our office [phone] or send us a [employer] message and we will remove your name from our list to indicate you have a new provider and will gladly send records to them if requested.  However if you have HMO insurance, you will need to call them to remove him from your list and pick any in-network provider.     Wish you all the best and stay healthy  -Dr. Beba NUR

## 2025-07-03 ENCOUNTER — TELEPHONE (OUTPATIENT)
Facility: LOCATION | Age: 55
End: 2025-07-03

## 2025-07-21 ENCOUNTER — OFFICE VISIT (OUTPATIENT)
Dept: INTERNAL MEDICINE CLINIC | Facility: CLINIC | Age: 55
End: 2025-07-21

## 2025-07-21 VITALS
SYSTOLIC BLOOD PRESSURE: 116 MMHG | WEIGHT: 241 LBS | HEART RATE: 84 BPM | HEIGHT: 73 IN | BODY MASS INDEX: 31.94 KG/M2 | DIASTOLIC BLOOD PRESSURE: 68 MMHG

## 2025-07-21 DIAGNOSIS — Z12.11 COLON CANCER SCREENING: Primary | ICD-10-CM

## 2025-07-21 DIAGNOSIS — F17.200 TOBACCO USE DISORDER: ICD-10-CM

## 2025-07-21 DIAGNOSIS — Z13.6 ENCOUNTER FOR SCREENING FOR CORONARY ARTERY DISEASE: ICD-10-CM

## 2025-07-21 DIAGNOSIS — J43.2 CENTRILOBULAR EMPHYSEMA (HCC): ICD-10-CM

## 2025-07-21 DIAGNOSIS — Z12.5 SCREENING FOR PROSTATE CANCER: ICD-10-CM

## 2025-07-21 DIAGNOSIS — Z00.00 ANNUAL PHYSICAL EXAM: ICD-10-CM

## 2025-07-21 DIAGNOSIS — M17.12 PRIMARY OSTEOARTHRITIS OF LEFT KNEE: ICD-10-CM

## 2025-07-21 DIAGNOSIS — Z82.49 FAMILY HISTORY OF EARLY CAD: ICD-10-CM

## 2025-07-21 DIAGNOSIS — J44.1 COPD WITH ACUTE EXACERBATION (HCC): ICD-10-CM

## 2025-07-21 DIAGNOSIS — E55.9 VITAMIN D DEFICIENCY: ICD-10-CM

## 2025-07-21 RX ORDER — HYDROCODONE BITARTRATE AND ACETAMINOPHEN 5; 325 MG/1; MG/1
1 TABLET ORAL DAILY PRN
Qty: 12 TABLET | Refills: 0 | Status: SHIPPED | OUTPATIENT
Start: 2025-07-21

## 2025-07-21 RX ORDER — ALBUTEROL SULFATE 90 UG/1
1 INHALANT RESPIRATORY (INHALATION)
Qty: 3 EACH | Refills: 1 | Status: SHIPPED | OUTPATIENT
Start: 2025-07-21

## 2025-07-21 RX ORDER — BUDESONIDE AND FORMOTEROL FUMARATE DIHYDRATE 160; 4.5 UG/1; UG/1
2 AEROSOL RESPIRATORY (INHALATION) 2 TIMES DAILY
Qty: 10.2 G | Refills: 0 | Status: SHIPPED | OUTPATIENT
Start: 2025-07-21

## 2025-07-21 NOTE — PROGRESS NOTES
OFFICE NOTE       The following individual(s) verbally consented to be recorded using ambient AI listening technology and understand that they can each withdraw their consent to this listening technology at any point by asking the clinician to turn off or pause the recording:    Patient name: Demetrio Kang  Additional names:            Patient ID: Demetrio Kang is a 55 year old male.  Today's Date: 07/21/25  Chief Complaint: Physical      History of Present Illness  Demetrio Kang is a 55 year old male who presents for an annual physical exam.    He has a history of COPD, hyperlipidemia, elevated calcium, and obesity. He has not completed a Cologuard test that was previously sent to him. He forgot about the appointment until reminded by his mother.    He is currently using Symbicort and albuterol for COPD management. He quit smoking cigarettes five to six years ago but continues to smoke marijuana. He has not been on statin therapy for hyperlipidemia despite a calcium score of 92.6 in 2021, as he prefers not to take daily medication.    He reports losing twelve pounds since his last visit and has been mindful of his diet. He experiences pain for which he uses Norco sparingly, stating he has about three pills left. He underwent an ultrasound in 2022, which did not provide a clear diagnosis for his pain. He mentions a history of living a 'fast life' and acknowledges potential damage to his body from past lifestyle choices.    He does not drive and relies on his 75-year-old mother for transportation. He lives in [place] and finds it challenging to travel far for medical appointments.    He denies chest pain or palpitations. Reports weight loss of twelve pounds since the last visit.       Vitals:    07/21/25 0824   BP: 116/68   Pulse: 84   Weight: 241 lb (109.3 kg)   Height: 6' 1\" (1.854 m)     body mass index is 31.8 kg/m².  BP Readings from Last 3 Encounters:   07/21/25 116/68   04/02/25 116/70    07/18/24 115/72     The 10-year ASCVD risk score (Mert BURNHAM, et al., 2019) is: 7.2%    Values used to calculate the score:      Age: 55 years      Sex: Male      Is Non- : No      Diabetic: No      Tobacco smoker: No      Systolic Blood Pressure: 116 mmHg      Is BP treated: No      HDL Cholesterol: 40 mg/dL      Total Cholesterol: 243 mg/dL  Results  RADIOLOGY  CT scan score: 92.6 (2021)       Medications reviewed:  Current Medications[1]      Assessment & Plan    1. Colon cancer screening (Primary)  -     COLOGUARD COLON CANCER SCREENING (EXTERNAL)  2. Annual physical exam  -     CT CALCIUM SCORING; Future; Expected date: 07/21/2025  -     EKG 12 Lead; Future; Expected date: 07/21/2025  -     PSA Total, Screen; Future; Expected date: 07/21/2025  -     Lipid Panel; Future; Expected date: 07/21/2025  -     TSH W Reflex To Free T4; Future; Expected date: 07/21/2025  -     Hemoglobin A1C; Future; Expected date: 07/21/2025  -     Comp Metabolic Panel (14); Future; Expected date: 07/21/2025  -     CBC With Differential With Platelet; Future; Expected date: 07/21/2025  -     Vitamin D; Future; Expected date: 07/21/2025  -     CT LUNG LD SCREENING(CPT=71271); Future; Expected date: 07/21/2025  -     EKG 12 Lead; Future; Expected date: 07/21/2025  3. Encounter for screening for coronary artery disease  -     CT CALCIUM SCORING; Future; Expected date: 07/21/2025  4. Family history of early CAD  -     EKG 12 Lead; Future; Expected date: 07/21/2025  -     EKG 12 Lead; Future; Expected date: 07/21/2025  5. Screening for prostate cancer  -     PSA Total, Screen; Future; Expected date: 07/21/2025  6. Vitamin D deficiency  -     Vitamin D; Future; Expected date: 07/21/2025  7. Tobacco use disorder  -     CT LUNG LD SCREENING(CPT=71271); Future; Expected date: 07/21/2025  8. Centrilobular emphysema (HCC)  -     Budesonide-Formoterol Fumarate; Inhale 2 puffs into the lungs 2 (two) times daily.  Dispense:  10.2 g; Refill: 0  -     Albuterol Sulfate HFA; Inhale 1 puff into the lungs every 4 to 6 hours as needed for Wheezing or Shortness of Breath. FOR ASTHMA (RESCUE INHALER) Pharmacist, please switch to formulary alternative per insurance coverage, ok to replace with proair, ventolin, proventil, or any other albuterol inhaler.  Dispense: 3 each; Refill: 1  9. COPD with acute exacerbation (HCC)  -     Budesonide-Formoterol Fumarate; Inhale 2 puffs into the lungs 2 (two) times daily.  Dispense: 10.2 g; Refill: 0  -     Albuterol Sulfate HFA; Inhale 1 puff into the lungs every 4 to 6 hours as needed for Wheezing or Shortness of Breath. FOR ASTHMA (RESCUE INHALER) Pharmacist, please switch to formulary alternative per insurance coverage, ok to replace with proair, ventolin, proventil, or any other albuterol inhaler.  Dispense: 3 each; Refill: 1  10. Primary osteoarthritis of left knee  -     HYDROcodone-Acetaminophen; Take 1 tablet by mouth daily as needed for Pain.  Dispense: 12 tablet; Refill: 0    Assessment & Plan  COPD  Continues Symbicort and albuterol. Quit smoking cigarettes but smokes marijuana. Emphasized annual lung cancer screening due to smoking history.  - Recommend annual lung cancer screening with CT scan.  - Continue Symbicort and albuterol inhalers with refills for six months.    Chronic pain  Uses Norco as needed. Multiple tests inconclusive. Discussed Norco prescription regulations and provided limited supply   - Prescribe 12 Norco pills, must have visit for further refills.   - Discuss the need for regular follow-up if chronic refills are required.    Hyperlipidemia  Has hyperlipidemia with a calcium score of 92.6. Recommended statin therapy due to elevated calcium score, but he declined. Discussed cholesterol management to prevent cardiovascular events.  - Discuss the benefits of statin therapy for hyperlipidemia.  - Order lipid panel.    Colorectal cancer screening  Has not completed Cologuard test.  Emphasized importance of completing test and ideally undergoing colonoscopy. Explained Cologuard sensitivity and colonoscopy as gold standard.  - Encourage completion of Cologuard test.  - Discuss the option of colonoscopy for colorectal cancer screening.    General Health Maintenance  Discussed importance of health screenings and tests. Prefers minimal medical intervention.  - Order baseline EKG, PSA, TSH, A1c, CMP, CBC, vitamin D level.  - Order calcium and calcium scan score.  - Provide education on the importance of regular health screenings.    Follow-up  Prefers minimal medical intervention and infrequent visits. Emphasized importance of regular follow-up.  - Provide 90-day medication refills with one additional refill.  - Encourage follow-up appointment scheduling.  - Discuss the importance of regular medical follow-up.       Follow Up: As needed/if symptoms worsen or Return in about 6 months (around 1/21/2026) for chronic care management ..     Objective/ Results:   Physical Exam  Constitutional:       Appearance: He is well-developed.   Cardiovascular:      Rate and Rhythm: Normal rate and regular rhythm.      Heart sounds: Normal heart sounds.   Pulmonary:      Effort: Pulmonary effort is normal.      Breath sounds: Normal breath sounds.   Abdominal:      General: Bowel sounds are normal.      Palpations: Abdomen is soft.   Skin:     General: Skin is warm and dry.   Neurological:      Mental Status: He is alert and oriented to person, place, and time.      Deep Tendon Reflexes: Reflexes are normal and symmetric.        Physical Exam       Reviewed:    Patient Active Problem List    Diagnosis    Elevated coronary artery calcium score     2021: 96      Obesity (BMI 30.0-34.9)    Mixed hyperlipidemia    Chronic obstructive pulmonary disease (HCC)      Allergies[2]   Short Social Hx on File[3]   Review of Systems   Constitutional: Negative.    Respiratory: Negative.     Cardiovascular: Negative.     Gastrointestinal: Negative.    Skin: Negative.    Neurological: Negative.        All other systems negative unless otherwise stated in ROS or HPI above.       Mor Yoder MD  Internal Medicine       Call office with any questions or seek emergency care if necessary.   Patient understands and agrees to follow directions and advice.      ----------------------------------------- PATIENT INSTRUCTIONS-----------------------------------------     There are no Patient Instructions on file for this visit.        The 21st Century Cures Act makes medical notes available to patients in the interest of transparency.  However, please be advised that this is a medical document.  It is intended as a peer to peer communication.  It is written in medical language and may contain abbreviations or verbiage that are technical and unfamiliar.  It may appear blunt or direct.  Medical documents are intended to carry relevant information, facts as evident, and the clinical opinion of the practitioner.          [1]   Current Outpatient Medications   Medication Sig Dispense Refill    Budesonide-Formoterol Fumarate (SYMBICORT) 160-4.5 MCG/ACT Inhalation Aerosol Inhale 2 puffs into the lungs 2 (two) times daily. 10.2 g 0    albuterol 108 (90 Base) MCG/ACT Inhalation Aero Soln Inhale 1 puff into the lungs every 4 to 6 hours as needed for Wheezing or Shortness of Breath. FOR ASTHMA (RESCUE INHALER) Pharmacist, please switch to formulary alternative per insurance coverage, ok to replace with proair, ventolin, proventil, or any other albuterol inhaler. 3 each 1    HYDROcodone-acetaminophen (NORCO) 5-325 MG Oral Tab Take 1 tablet by mouth daily as needed for Pain. 12 tablet 0   [2]   Allergies  Allergen Reactions    Seasonal Runny nose   [3]   Social History  Socioeconomic History    Marital status: Single    Number of children: 0   Occupational History    Occupation:      Comment: unemployed   Tobacco Use    Smoking status: Former      Current packs/day: 0.00     Average packs/day: 1 pack/day for 20.0 years (20.0 ttl pk-yrs)     Types: Cigarettes     Start date: 1999     Quit date: 2019     Years since quittin.1    Smokeless tobacco: Former   Vaping Use    Vaping status: Never Used   Substance and Sexual Activity    Alcohol use: Yes     Alcohol/week: 0.0 standard drinks of alcohol     Comment: occasionally    Drug use: Yes     Types: Cannabis     Comment: daily   Other Topics Concern    Grew up on a farm No    Outdoor occupation Yes    Pt has a pacemaker No    Pt has a defibrillator No    Reaction to local anesthetic No

## 2025-07-23 ENCOUNTER — LAB ENCOUNTER (OUTPATIENT)
Dept: LAB | Age: 55
End: 2025-07-23
Attending: STUDENT IN AN ORGANIZED HEALTH CARE EDUCATION/TRAINING PROGRAM
Payer: MEDICAID

## 2025-07-23 DIAGNOSIS — Z00.00 ANNUAL PHYSICAL EXAM: ICD-10-CM

## 2025-07-23 DIAGNOSIS — Z12.5 SCREENING FOR PROSTATE CANCER: ICD-10-CM

## 2025-07-23 DIAGNOSIS — E55.9 VITAMIN D DEFICIENCY: ICD-10-CM

## 2025-07-23 DIAGNOSIS — Z82.49 FAMILY HISTORY OF EARLY CAD: ICD-10-CM

## 2025-07-23 LAB
ALBUMIN SERPL-MCNC: 4.7 G/DL (ref 3.2–4.8)
ALBUMIN/GLOB SERPL: 1.7 {RATIO} (ref 1–2)
ALP LIVER SERPL-CCNC: 81 U/L (ref 45–117)
ALT SERPL-CCNC: 11 U/L (ref 10–49)
ANION GAP SERPL CALC-SCNC: 8 MMOL/L (ref 0–18)
AST SERPL-CCNC: 14 U/L (ref ?–34)
ATRIAL RATE: 81 BPM
BASOPHILS # BLD AUTO: 0.04 X10(3) UL (ref 0–0.2)
BASOPHILS NFR BLD AUTO: 0.7 %
BILIRUB SERPL-MCNC: 0.7 MG/DL (ref 0.3–1.2)
BUN BLD-MCNC: 14 MG/DL (ref 9–23)
BUN/CREAT SERPL: 13.2 (ref 10–20)
CALCIUM BLD-MCNC: 9.5 MG/DL (ref 8.7–10.4)
CHLORIDE SERPL-SCNC: 101 MMOL/L (ref 98–112)
CHOLEST SERPL-MCNC: 211 MG/DL (ref ?–200)
CO2 SERPL-SCNC: 28 MMOL/L (ref 21–32)
COMPLEXED PSA SERPL-MCNC: 2.85 NG/ML (ref ?–4)
CREAT BLD-MCNC: 1.06 MG/DL (ref 0.7–1.3)
DEPRECATED RDW RBC AUTO: 42.9 FL (ref 35.1–46.3)
EGFRCR SERPLBLD CKD-EPI 2021: 83 ML/MIN/1.73M2 (ref 60–?)
EOSINOPHIL # BLD AUTO: 0.11 X10(3) UL (ref 0–0.7)
EOSINOPHIL NFR BLD AUTO: 1.8 %
ERYTHROCYTE [DISTWIDTH] IN BLOOD BY AUTOMATED COUNT: 13 % (ref 11–15)
EST. AVERAGE GLUCOSE BLD GHB EST-MCNC: 126 MG/DL (ref 68–126)
FASTING PATIENT LIPID ANSWER: YES
FASTING STATUS PATIENT QL REPORTED: YES
GLOBULIN PLAS-MCNC: 2.8 G/DL (ref 2–3.5)
GLUCOSE BLD-MCNC: 100 MG/DL (ref 70–99)
HBA1C MFR BLD: 6 % (ref ?–5.7)
HCT VFR BLD AUTO: 43.8 % (ref 39–53)
HDLC SERPL-MCNC: 45 MG/DL (ref 40–59)
HGB BLD-MCNC: 15.2 G/DL (ref 13–17.5)
IMM GRANULOCYTES # BLD AUTO: 0.01 X10(3) UL (ref 0–1)
IMM GRANULOCYTES NFR BLD: 0.2 %
LDLC SERPL CALC-MCNC: 146 MG/DL (ref ?–100)
LYMPHOCYTES # BLD AUTO: 1.76 X10(3) UL (ref 1–4)
LYMPHOCYTES NFR BLD AUTO: 28.9 %
MCH RBC QN AUTO: 31.3 PG (ref 26–34)
MCHC RBC AUTO-ENTMCNC: 34.7 G/DL (ref 31–37)
MCV RBC AUTO: 90.3 FL (ref 80–100)
MONOCYTES # BLD AUTO: 0.48 X10(3) UL (ref 0.1–1)
MONOCYTES NFR BLD AUTO: 7.9 %
NEUTROPHILS # BLD AUTO: 3.68 X10 (3) UL (ref 1.5–7.7)
NEUTROPHILS # BLD AUTO: 3.68 X10(3) UL (ref 1.5–7.7)
NEUTROPHILS NFR BLD AUTO: 60.5 %
NONHDLC SERPL-MCNC: 166 MG/DL (ref ?–130)
OSMOLALITY SERPL CALC.SUM OF ELEC: 285 MOSM/KG (ref 275–295)
P AXIS: 53 DEGREES
P-R INTERVAL: 140 MS
PLATELET # BLD AUTO: 267 10(3)UL (ref 150–450)
POTASSIUM SERPL-SCNC: 3.8 MMOL/L (ref 3.5–5.1)
PROT SERPL-MCNC: 7.5 G/DL (ref 5.7–8.2)
Q-T INTERVAL: 386 MS
QRS DURATION: 102 MS
QTC CALCULATION (BEZET): 448 MS
R AXIS: 13 DEGREES
RBC # BLD AUTO: 4.85 X10(6)UL (ref 4.3–5.7)
SODIUM SERPL-SCNC: 137 MMOL/L (ref 136–145)
T AXIS: 36 DEGREES
TRIGL SERPL-MCNC: 109 MG/DL (ref 30–149)
TSI SER-ACNC: 2.05 UIU/ML (ref 0.55–4.78)
VENTRICULAR RATE: 81 BPM
VIT D+METAB SERPL-MCNC: 34.5 NG/ML (ref 30–100)
VLDLC SERPL CALC-MCNC: 20 MG/DL (ref 0–30)
WBC # BLD AUTO: 6.1 X10(3) UL (ref 4–11)

## 2025-07-23 PROCEDURE — 84443 ASSAY THYROID STIM HORMONE: CPT

## 2025-07-23 PROCEDURE — 93010 ELECTROCARDIOGRAM REPORT: CPT | Performed by: INTERNAL MEDICINE

## 2025-07-23 PROCEDURE — 93005 ELECTROCARDIOGRAM TRACING: CPT

## 2025-07-23 PROCEDURE — 83036 HEMOGLOBIN GLYCOSYLATED A1C: CPT

## 2025-07-23 PROCEDURE — 80053 COMPREHEN METABOLIC PANEL: CPT

## 2025-07-23 PROCEDURE — 82306 VITAMIN D 25 HYDROXY: CPT

## 2025-07-23 PROCEDURE — 85025 COMPLETE CBC W/AUTO DIFF WBC: CPT

## 2025-07-23 PROCEDURE — 36415 COLL VENOUS BLD VENIPUNCTURE: CPT

## 2025-07-23 PROCEDURE — 80061 LIPID PANEL: CPT

## 2025-08-05 ENCOUNTER — MED REC SCAN ONLY (OUTPATIENT)
Dept: INTERNAL MEDICINE CLINIC | Facility: CLINIC | Age: 55
End: 2025-08-05

## (undated) DEVICE — CASED DISP BIPOLAR CORD

## (undated) DEVICE — SUTURE SILK 2-0 SA65H

## (undated) DEVICE — ARISTA 1 GRAM

## (undated) DEVICE — SUT PDS II 4-0 PS-2 Z496G

## (undated) DEVICE — TRAY SKIN PREP PVP-1

## (undated) DEVICE — GOWN SURG AERO BLUE PERF LG

## (undated) DEVICE — SUTURE PROLENE 4-0 FS-2

## (undated) DEVICE — DECANTER SPIKE TRANSFLOW

## (undated) DEVICE — STIRRUP STRAP W/SLING RING

## (undated) DEVICE — MINOR GENERAL: Brand: MEDLINE INDUSTRIES, INC.

## (undated) DEVICE — CAUTERY BLADE 2IN INS E1455

## (undated) DEVICE — NECK ACCESSORY: Brand: MEDLINE INDUSTRIES, INC.

## (undated) DEVICE — STERILE LATEX POWDER-FREE SURGICAL GLOVESWITH NITRILE COATING: Brand: PROTEXIS

## (undated) DEVICE — SUTURE VICRYL 3-0 SH

## (undated) DEVICE — HEAD & NECK: Brand: MEDLINE INDUSTRIES, INC.

## (undated) DEVICE — 3M™ STERI-STRIP™ REINFORCED ADHESIVE SKIN CLOSURES, R1547, 1/2 IN X 4 IN (12 MM X 100 MM), 6 STRIPS/ENVELOPE: Brand: 3M™ STERI-STRIP™

## (undated) DEVICE — SOLUTION  .9 1000ML BTL

## (undated) DEVICE — ENCORE® LATEX ACCLAIM SIZE 8, STERILE LATEX POWDER-FREE SURGICAL GLOVE: Brand: ENCORE

## (undated) DEVICE — SUTURE ETHILON 3-0 669H

## (undated) DEVICE — BLADE SHVR COOLCUT 13CM 4MM

## (undated) DEVICE — SUCTION CANISTER, 3000CC,SAFELINER: Brand: DEROYAL

## (undated) DEVICE — ARTHROSCOPY: Brand: MEDLINE INDUSTRIES, INC.

## (undated) DEVICE — SUTURE ETHILON 4-0 1667G

## (undated) DEVICE — INTENDED FOR TISSUE SEPARATION, AND OTHER PROCEDURES THAT REQUIRE A SHARP SURGICAL BLADE TO PUNCTURE OR CUT.: Brand: BARD-PARKER ® STAINLESS STEEL BLADES

## (undated) DEVICE — SUT PLAIN GUT 5-0 PC-1 1915G

## (undated) DEVICE — DRAPE SHEET LG

## (undated) DEVICE — DRAPE SRG 70X60IN SPLT U IMPRV

## (undated) DEVICE — COVER SGL STRL LGHT HNDL BLU

## (undated) DEVICE — REM POLYHESIVE ADULT PATIENT RETURN ELECTRODE: Brand: VALLEYLAB

## (undated) DEVICE — ZIMMER® STERILE DISPOSABLE TOURNIQUET CUFF WITH PLC, DUAL PORT, SINGLE BLADDER, 30 IN. (76 CM)

## (undated) DEVICE — TUBING IRR 16FT CNT WV 3 ASCP

## (undated) DEVICE — APPLICATOR COTTON TIP 3 10/PK

## (undated) DEVICE — SOL  .9 3000ML

## (undated) DEVICE — GOWN SURG AERO BLUE PERF XLG

## (undated) DEVICE — SUTURE MONOCRYL 4-0 Y845G

## (undated) DEVICE — SOL  .9 1000ML BTL

## (undated) DEVICE — 3 ML SYRINGE LUER-LOCK TIP: Brand: MONOJECT

## (undated) DEVICE — STERILE TETRA-FLEX CF, ELASTIC BANDAGE, 4" X 5.5YD: Brand: TETRA-FLEX™CF

## (undated) DEVICE — AMBIENT SUPER TURBOVAC 90 IFS: Brand: COBLATION

## (undated) NOTE — LETTER
07/07/21        [facility] [address]      Dear Rich Nath,    [address] records indicate that you have outstanding lab work and or testing that was ordered for you and has not yet been completed:  Orders Placed This Encounter

## (undated) NOTE — MR AVS SNAPSHOT
[address], [address]  [address] [facility]  [phone]               Thank you for choosing us for your health care visit with Nathaly Pickett MD.  We are glad to serve you and happy to provide you with this summar - HYDROcodone-acetaminophen  MG Tabs            Today's Orders     Surgery Referral - In Network    Complete by:  As directed    Assoc Dx:  Neck mass [R22.1]                 Referral Details     Referred By    Referred To    Bronson Benoit MD   06

## (undated) NOTE — MR AVS SNAPSHOT
[address], [place]  [address]  [address]  [phone]               Thank you for choosing us for your health care visit with Abisai Chaves MD.  We are glad to serve you and happy to provide you with this summary of

## (undated) NOTE — Clinical Note
Antonio Vanegas, [address]  [place], [place]       04/11/2017        Patient: Javier Pritchett   YOB: 1970   Date of Visit: 4/11/2017       Dear  Dr. Tania Mohamud MD,      Thank you for referring Javier Pritchett to my practice.   Pl

## (undated) NOTE — MR AVS SNAPSHOT
[address]  [phone]               Thank you for choosing us for your health care visit with Ismael Moore.  Nate Rodriguez MD.  We are glad to serve you and happy to provide you with this summar

## (undated) NOTE — LETTER
No referring provider defined for this encounter. 01/30/18        Patient: Tracy Vera   YOB: 1970   Date of Visit: 1/30/2018       Dear  Dr. Norma Romano MD,      Thank you for referring Tracy Vera to my practice.   Please find my

## (undated) NOTE — MR AVS SNAPSHOT
[place]  [address]  [phone]               Thank you for choosing us for your health care visit with Joss Shultz MD.  We are glad to serve you and happy to provide you with this summary Monday February 13, 2017     Imaging:  CT SOFT TISSUE OF NECK (W+WO) (CPT=70492)    Instructions:   To schedule a test at any [facility], call Central Scheduling at [phone], Monday through Friday between 7:30am to 6pm and Tips for increasing your physical activity – Adults who are physically active are less likely to develop some chronic diseases than adults who are inactive.      HOW TO GET STARTED: HOW TO STAY MOTIVATED:   Start activities slowly and build up over time Do

## (undated) NOTE — Clinical Note
Rosina Bose, [address] [facility]  [place], [place]       02/13/2017        Patient: Demetri Swanson   YOB: 1970   Date of Visit: 2/13/2017       Dear  Dr. Joey Arellano MD,      Thank you for referring Demetri Swanson to my practice.   Pl

## (undated) NOTE — MR AVS SNAPSHOT
[address]  [phone]               Thank you for choosing us for your health care visit with Boyd Ferris.  Kylah Gonzalez MD.  We are glad to serve you and happy to provide you with this summar

## (undated) NOTE — MR AVS SNAPSHOT
[address], [address]  [address] [facility]  [phone]               Thank you for choosing us for your health care visit with Betty العلي MD.  We are glad to serve you and happy to provide you with this summar Monday March 27, 2017     Imaging:  XR CHEST PA + LAT CHEST (HAF=[id number])    Instructions: To schedule a test at any [facility] Scheduling at   [phone].          Referral Details     Referred By    Referred To Today's Vital Signs     BP Pulse Temp Height Weight BMI    120/74 mmHg 64 97.6 °F (36.4 °C) (Oral) 6' 1\" (1.854 m) 235 lb (106.595 kg) 31.01 kg/m2         Current Medications          This list is accurate as of: 3/27/17 12:09 PM.  Always use your most re

## (undated) NOTE — IP AVS SNAPSHOT
[facility]    [address], [place] ~ [phone]                Discharge Summary   5/1/2017    Keenan Deutsch           Admission Information        Provider Department    5/1/2017 Freddie Jordan.  Tyrel Foster MD [facility] (while you were asleep!). The sore throat should get better within 48 hours. You can gargle with warm salt water (1/2 tsp in 4 oz warm water) or use a throat lozenge for comfort  · To feel muscle aches or soreness especially in the abdomen, chest or neck. If you receive a NSQIP questionnaire, and have questions please contact:   Dorothy Dorsey RN, MA, [place]  [phone]      Discharge References/Attachments     DISCHARGE INSTRUCTIONS: AFTER YOUR S - If you don’t have insurance, [facility] Col[facility] has partnered with [facility] to help you get signed up for insurance coverage.   [facility] is a Federal Navigator program that can help with your Affordable Care Act cover